# Patient Record
Sex: MALE | Race: WHITE | NOT HISPANIC OR LATINO | Employment: OTHER | URBAN - METROPOLITAN AREA
[De-identification: names, ages, dates, MRNs, and addresses within clinical notes are randomized per-mention and may not be internally consistent; named-entity substitution may affect disease eponyms.]

---

## 2018-10-02 ENCOUNTER — OFFICE VISIT (OUTPATIENT)
Dept: FAMILY MEDICINE | Facility: CLINIC | Age: 68
End: 2018-10-02
Payer: MEDICARE

## 2018-10-02 VITALS
DIASTOLIC BLOOD PRESSURE: 65 MMHG | WEIGHT: 210.63 LBS | HEART RATE: 56 BPM | TEMPERATURE: 98 F | SYSTOLIC BLOOD PRESSURE: 107 MMHG | HEIGHT: 71 IN | BODY MASS INDEX: 29.49 KG/M2

## 2018-10-02 DIAGNOSIS — M54.50 LOW BACK PAIN, NON-SPECIFIC: ICD-10-CM

## 2018-10-02 DIAGNOSIS — M54.50 ACUTE BILATERAL LOW BACK PAIN WITHOUT SCIATICA: ICD-10-CM

## 2018-10-02 DIAGNOSIS — M51.36 DDD (DEGENERATIVE DISC DISEASE), LUMBAR: Primary | ICD-10-CM

## 2018-10-02 PROCEDURE — 3008F BODY MASS INDEX DOCD: CPT | Mod: ,,, | Performed by: FAMILY MEDICINE

## 2018-10-02 PROCEDURE — 1101F PT FALLS ASSESS-DOCD LE1/YR: CPT | Mod: ,,, | Performed by: FAMILY MEDICINE

## 2018-10-02 PROCEDURE — 99213 OFFICE O/P EST LOW 20 MIN: CPT | Mod: PBBFAC,PO | Performed by: FAMILY MEDICINE

## 2018-10-02 PROCEDURE — 99213 OFFICE O/P EST LOW 20 MIN: CPT | Mod: S$PBB,,, | Performed by: FAMILY MEDICINE

## 2018-10-02 PROCEDURE — 99999 PR PBB SHADOW E&M-EST. PATIENT-LVL III: CPT | Mod: PBBFAC,,, | Performed by: FAMILY MEDICINE

## 2018-10-02 RX ORDER — METHYLPREDNISOLONE 4 MG/1
TABLET ORAL
Qty: 1 PACKAGE | Refills: 0 | Status: SHIPPED | OUTPATIENT
Start: 2018-10-02 | End: 2019-04-29

## 2018-10-02 RX ORDER — DICLOFENAC SODIUM 75 MG/1
75 TABLET, DELAYED RELEASE ORAL 2 TIMES DAILY
Qty: 60 TABLET | Refills: 1 | Status: SHIPPED | OUTPATIENT
Start: 2018-10-02 | End: 2018-11-28 | Stop reason: SDUPTHER

## 2018-10-02 NOTE — PROGRESS NOTES
The patient presents with tender painful low back p 1 m initiated w teisting motion. Has no sig radiating pain   ROS:  General: No fever or sig wt change  HEENT:No other PND eye pain or dc  Respiratory: No cough wheezing  PE: vital signs noted  HEENT: Normocephalic,with no recent trauma,PERRLA,EOMI,conjunctiva injected with no exudate.Nasopharynx is injected and edematous.External otic canal edematous and injected TM dull  Neck:Supple without adenopathy  Chest:Clear bilateral breath sounds   Heart:Regular rhthym without murmer  Abdomen:Soft, non tender,no masses, no hepatosplenomegaly  Extremeties wnl  Back:Tender mid back over scar , bilateral spasm    Impression:LBP hx Lum DDD    Plan:  Medrol dspk  PT-pt defers  NS consult

## 2018-10-03 ENCOUNTER — TELEPHONE (OUTPATIENT)
Dept: FAMILY MEDICINE | Facility: CLINIC | Age: 68
End: 2018-10-03

## 2018-10-03 NOTE — TELEPHONE ENCOUNTER
----- Message from Analia Higgins sent at 10/3/2018  2:41 PM CDT -----  Contact: [facility], radiology  Needs order for MRI faxed to them at [phone]. Thank you

## 2018-10-03 NOTE — TELEPHONE ENCOUNTER
----- Message from Muna Soriano sent at 10/3/2018 12:01 PM CDT -----  This message has been forwarded to Adelina Calix over Radiology  ----- Message -----  From: Lorelei Santana MA  Sent: 10/3/2018  11:07 AM  To: Pre Service Intake    Could someone please work the outgoing MRI referral for the above pt, he is scheduled for the MRI on Friday 10/5/18

## 2018-10-03 NOTE — TELEPHONE ENCOUNTER
----- Message from Mell Kenney sent at 10/3/2018 11:11 AM CDT -----  Forwarded to Adelina Calix, supervisor radiology team    ----- Message -----  From: Lorelei Santana MA  Sent: 10/3/2018  11:07 AM  To: Pre Service Intake    Could someone please work the outgoing MRI referral for the above pt, he is scheduled for the MRI on Friday 10/5/18

## 2018-10-09 ENCOUNTER — TELEPHONE (OUTPATIENT)
Dept: FAMILY MEDICINE | Facility: CLINIC | Age: 68
End: 2018-10-09

## 2018-10-09 DIAGNOSIS — M51.36 DDD (DEGENERATIVE DISC DISEASE), LUMBAR: Primary | ICD-10-CM

## 2018-10-09 NOTE — TELEPHONE ENCOUNTER
----- Message from Luis Bejarano sent at 10/9/2018  8:15 AM CDT -----  Contact: pt   Pt is requesting a call call back from the nurse in regards to his MRI results.  [phone]

## 2018-10-09 NOTE — TELEPHONE ENCOUNTER
REASON FOR EXAM: [M54.5]-Low back pain   TECHNICAL FACTORS: Sagittal T2, sagittal T1, sagittal fat-suppressed T2 and axial T1 and T2 sequences were obtained of the lumbar spine without administration of intravenous contrast. Post-IV contrast sagittal fat suppressed and axial images were obtained.   DOSE: 20 mL ProHance   COMPARISON: Lumbar spine radiographs July 22, 2012   FINDINGS: Alignment is normal. Bone marrow is normal in signal intensity without focal lesion. The conus is normal in signal intensity. The conus terminates at the T12-L1 level. There is mild anterior wedging of the T12 vertebral body, partially visualized on this exam, suggestive of chronic compression fracture. Lumbar vertebral body heights are maintained. There is loss of disc height at L5-S1. There is no evidence of abnormal enhancement. T2 hyperintense nonenhancing right renal cyst is present measuring 10 mm.   At the L1-2 level, there is no evidence of central canal stenosis or neural foraminal narrowing.   At the L2-3 level, facet hypertrophy is present without central canal stenosis or neural foraminal narrowing.   At the L3-4 level, minimal disc bulge and facet hypertrophy are present without central canal stenosis or neural foraminal narrowing.   At the L4-5 level, right paracentral disc protrusion has mass effect on the traversing right L5 nerve within the lateral recess. Mild facet hypertrophy is present. There is no evidence of central canal stenosis or neural foraminal narrowing.   At the L5-S1 level, mild disc bulge and facet hypertrophy are present resulting in mild right neural foraminal narrowing. A T2 hyperintense Tarlov cyst is present within the right S2 neural foramen.   IMPRESSION:   1. Degenerative disc disease and facet osteoarthritis of the lumbar spine with right paracentral disc protrusion at L4-L5 having mass effect on the traversing right L5 nerve within the lateral recess.   1. Right renal cyst.  2. Tarlov  cysts.  Electronically signed by Delfino Peterson MD on 10/5/2018 11:41 AM

## 2018-10-09 NOTE — TELEPHONE ENCOUNTER
----- Message from Luis Bejarano sent at 10/9/2018  8:15 AM CDT -----  Contact: pt   Pt is requesting a call call back from the nurse in regards to his MRI results.  [phone]

## 2018-10-09 NOTE — TELEPHONE ENCOUNTER
----- Message from Luis Bejarano sent at 10/9/2018  8:15 AM CDT -----  Contact: pt   Pt is requesting a call call back from the nurse in regards to his MRI results.  [phone]

## 2018-10-09 NOTE — TELEPHONE ENCOUNTER
Nelson, I see that you ordered and are already handling this MRI on this patient.  I have never seen the patient.  Do you need for me to get involved or are you handling this at this point?  Let me know what you would like for me to do.

## 2018-10-09 NOTE — TELEPHONE ENCOUNTER
----- Message from Luis Bejarano sent at 10/9/2018  8:15 AM CDT -----  Contact: pt   Pt is requesting a call call back from the nurse in regards to his MRI results.  [phone]

## 2018-10-15 ENCOUNTER — HOSPITAL ENCOUNTER (OUTPATIENT)
Dept: RADIOLOGY | Facility: HOSPITAL | Age: 68
Discharge: HOME | End: 2018-10-15
Attending: NEUROLOGICAL SURGERY
Payer: MEDICARE

## 2018-10-15 ENCOUNTER — OFFICE VISIT (OUTPATIENT)
Dept: NEUROSURGERY | Facility: CLINIC | Age: 68
End: 2018-10-15
Payer: MEDICARE

## 2018-10-15 VITALS
WEIGHT: 211.88 LBS | HEIGHT: 71 IN | SYSTOLIC BLOOD PRESSURE: 156 MMHG | BODY MASS INDEX: 29.66 KG/M2 | TEMPERATURE: 98 F | HEART RATE: 50 BPM | DIASTOLIC BLOOD PRESSURE: 76 MMHG

## 2018-10-15 DIAGNOSIS — M51.36 DDD (DEGENERATIVE DISC DISEASE), LUMBAR: ICD-10-CM

## 2018-10-15 DIAGNOSIS — Z98.890 H/O LUMBAR DISCECTOMY: Primary | ICD-10-CM

## 2018-10-15 PROCEDURE — 72110 X-RAY EXAM L-2 SPINE 4/>VWS: CPT | Mod: 26,,, | Performed by: RADIOLOGY

## 2018-10-15 PROCEDURE — 72110 X-RAY EXAM L-2 SPINE 4/>VWS: CPT | Mod: TC

## 2018-10-15 PROCEDURE — 99203 OFFICE O/P NEW LOW 30 MIN: CPT | Mod: S$PBB,,, | Performed by: NEUROLOGICAL SURGERY

## 2018-10-15 PROCEDURE — 99999 PR PBB SHADOW E&M-EST. PATIENT-LVL III: CPT | Mod: PBBFAC,,, | Performed by: NEUROLOGICAL SURGERY

## 2018-10-15 PROCEDURE — 3008F BODY MASS INDEX DOCD: CPT | Mod: ,,, | Performed by: NEUROLOGICAL SURGERY

## 2018-10-15 PROCEDURE — 1101F PT FALLS ASSESS-DOCD LE1/YR: CPT | Mod: ,,, | Performed by: NEUROLOGICAL SURGERY

## 2018-10-15 PROCEDURE — 99213 OFFICE O/P EST LOW 20 MIN: CPT | Mod: PBBFAC,25 | Performed by: NEUROLOGICAL SURGERY

## 2018-10-15 NOTE — PROGRESS NOTES
History & Physical    I, Uvaldo Sim, attest that this documentation has been prepared under the direction and in the presence of iVctoriano Frias MD.    10/16/2018    Chief Complaint   Patient presents with    Consult       History of Present Illness:  Wilbert Luna is a 67 y.o. patient with history of mechanical fall at work 30 years ago and previous L5-S1 laminectomy for a disc herniation. Patient was referred to me by Dr. Wale Joy MD for evaluation of back pain. Patient states that from his previous laminectomy, he had resolution of right-sided radiculopathic pain but has since continued to have on/off low back pain lasting 4 weeks at a time. He denies any back pain currently, stating that it went away 3 days ago after he jumped up from his bed to wake up from a nightmare  (Patient thinks that his back got realign after he jumped out from the bed).     Patient complains of bilateral low back pain described as aching and stabbing pain associated with back spasms. When he has pain, it is 10/10 in intensity. Exacerbating factors include bending forwards, lifting heavy objects. He states that the pain has been triggered in the past with certain motions such as squatting with weights, trunk rotation (bowling a bowling ball), walking up an incline or stairs. The pain is sometimes worse on either side and sometimes worse at the midline. Patient states having 20 episodes of back pain flare ups over the last 30 years. Past treatments include Medrol Dosepak (partial alleviation after about 1 week). Patient denies bowel/bladder incontinence. 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       Review of patient's allergies indicates:  No Known Allergies    Current Outpatient Medications   Medication Sig Dispense Refill    diclofenac (VOLTAREN) 75 MG EC tablet Take 1 tablet (75 mg total) by mouth 2 (two) times daily. 60 tablet 1     "esomeprazole (NEXIUM) 40 MG capsule Take 40 mg by mouth before breakfast.      methylPREDNISolone (MEDROL DOSEPACK) 4 mg tablet As directed 1 Package 0     No current facility-administered medications for this visit.        Past Medical History:   Diagnosis Date    Cancer skin cancer on head       Past Surgical History:   Procedure Laterality Date    HERNIA REPAIR      SPINE SURGERY         Family History   Problem Relation Age of Onset    Hypertension Mother     Cancer Father     Hypertension Maternal Grandmother     Thyroid disease Maternal Grandmother     Amblyopia Neg Hx     Blindness Neg Hx     Cataracts Neg Hx     Diabetes Neg Hx     Glaucoma Neg Hx     Macular degeneration Neg Hx     Retinal detachment Neg Hx     Strabismus Neg Hx     Stroke Neg Hx        Social History     Tobacco Use    Smoking status: Never Smoker    Smokeless tobacco: Never Used   Substance Use Topics    Alcohol use: No    Drug use: No        Review of Systems:  Review of Systems   Constitutional: Negative for appetite change.   HENT: Negative for congestion.    Eyes: Negative for discharge.   Respiratory: Negative for apnea.    Cardiovascular: Negative for chest pain.   Gastrointestinal: Negative for abdominal distention.   Endocrine: Negative for cold intolerance.   Genitourinary: Negative for difficulty urinating.   Musculoskeletal: Negative for arthralgias.   Allergic/Immunologic: Negative for environmental allergies.   Neurological: Negative for dizziness, weakness and headaches.   Psychiatric/Behavioral: Negative for agitation.       Vital Signs (Most Recent)  Temp: 98.1 °F (36.7 °C) (10/15/18 0909)  Pulse: (!) 50 (10/15/18 0909)  BP: (!) 156/76 (10/15/18 0909)  5' 11" (1.803 m)  96.1 kg (211 lb 14.4 oz)       Physical Exam:  Physical Exam:    Constitutional: He appears well-developed.     Eyes: Pupils are equal, round, and reactive to light. EOM are normal. Right eye exhibits no discharge. Left eye exhibits no " discharge.     Abdominal: Soft.     Skin: Skin displays no rash on trunk and no rash on extremities.     Psych/Behavior: He is alert. He is oriented to person, place, and time.     Musculoskeletal: Gait is normal.        Neck: There is no tenderness.        Back: Range of motion is full.        Right Upper Extremities: Range of motion is full. Muscle strength is 5/5. Tone is normal.        Left Upper Extremities: Range of motion is full. Muscle strength is 5/5. Tone is normal.       Right Lower Extremities: Range of motion is full. Muscle strength is 5/5. Tone is normal.        Left Lower Extremities: Range of motion is full. Muscle strength is 5/5. Tone is normal.     Neurological:        Coordination: He has a normal Romberg Test and normal tandem walking coordination.        Sensory: There is no sensory deficit in the trunk. There is no sensory deficit in the extremities.        DTRs: DTRs are DTRS NORMAL AND SYMMETRICnormal and symmetric. Tricep reflexes are 2+ on the right side and 2+ on the left side. Bicep reflexes are 2+ on the right side and 2+ on the left side. Brachioradialis reflexes are 2+ on the right side and 2+ on the left side. Patellar reflexes are 2+ on the right side and 2+ on the left side. Achilles reflexes are 2+ on the right side and 2+ on the left side. He displays no Babinski's sign on the right side. He displays no Babinski's sign on the left side.        Cranial nerves: Cranial nerve(s) II, III, IV, V, VI, VII, VIII, IX, X, XI and XII are intact.     Normal tandem gait.   Negative Romberg.   Strength is 5/5 throughout bilateral lower extremities   Negative straight leg raising test and negative Richy's test bilaterally.   Sensation is intact to light touch throughout     Laboratory  CBC: Reviewed  CMP: Reviewed    Diagnostic Results:  X-Ray: Reviewed  I have personally reviewed the MRI of the Lumbar spine from [facility]  Dated 10/05/2018.   Lumbar spondylosis without significant canal  or foraminal stenosis. Previous L5-S1 discectomy    ASSESSMENT/PLAN:       ICD-10-CM ICD-9-CM   1. H/O lumbar discectomy Z98.890 V15.29   2. DDD (degenerative disc disease), lumbar M51.36 722.52       PLAN:    1. Lumbar spondylosis without significant canal or foraminal stenosis and lower back spasms in the setting of previous L5-S1 discectomy. Patient's symptoms are non-radiculopathic in nature and are very sporadic, only when he lifts weights or rotates his lower spine. At this point, he does not require any neurosurgical interventions, but we will get flexion/extension x-rays of the lumbar spine to rule out instability.     2. RTC PRN or sooner if any positive findings on flexion/extension x-rays of the lumbar spine.     3. I spent 45 minutes with the patient, 50% of time in counselingabout the above mentioned issues.            Wilbert was seen today for consult.    Diagnoses and all orders for this visit:    H/O lumbar discectomy    DDD (degenerative disc disease), lumbar  -     X-Ray Lumbar Complete With Flex And Ext; Future  -     Ambulatory Referral to Physical/Occupational Therapy      I, Dr. Victoriano Frias, personally performed the services described in this documentation. All medical record entries made by the scribe were at my direction and in my presence.  I have reviewed the chart and agree that the record reflects my personal performance and is accurate and complete. Victoriano Frias MD.  10:00 AM 10/16/2018

## 2018-10-16 ENCOUNTER — TELEPHONE (OUTPATIENT)
Dept: NEUROSURGERY | Facility: CLINIC | Age: 68
End: 2018-10-16

## 2018-10-16 PROBLEM — Z98.890 H/O LUMBAR DISCECTOMY: Status: ACTIVE | Noted: 2018-10-16

## 2018-10-16 NOTE — TELEPHONE ENCOUNTER
Pt was contacted and informed.  ----- Message from Victoriano Frias MD sent at 10/16/2018 10:02 AM CDT -----   This gentleman's x-ray look within normal limits.  There is no instability.  He does not need further follow-up with Neurosurgery.  Please let the patient know ( there is a previous T12 compression fracture, that is stable on x-rays, compared with previous MRI from 2014)     thanks    e

## 2018-11-19 ENCOUNTER — PES CALL (OUTPATIENT)
Dept: ADMINISTRATIVE | Facility: CLINIC | Age: 68
End: 2018-11-19

## 2018-11-21 ENCOUNTER — PES CALL (OUTPATIENT)
Dept: ADMINISTRATIVE | Facility: CLINIC | Age: 68
End: 2018-11-21

## 2018-11-28 DIAGNOSIS — Z79.899 MEDICATION MANAGEMENT: Primary | ICD-10-CM

## 2018-11-28 RX ORDER — DICLOFENAC SODIUM 75 MG/1
75 TABLET, DELAYED RELEASE ORAL 2 TIMES DAILY
Qty: 180 TABLET | Refills: 0 | Status: SHIPPED | OUTPATIENT
Start: 2018-11-28 | End: 2019-04-29

## 2018-11-28 NOTE — TELEPHONE ENCOUNTER
I have signed for the following orders AND/OR meds.  Please call the patient and ask the patient to schedule the testing AND/OR inform about any medications that were sent.      Orders Placed This Encounter   Procedures    Basic metabolic panel     Standing Status:   Future     Standing Expiration Date:   1/27/2020    CBC auto differential     Standing Status:   Future     Standing Expiration Date:   1/27/2020       Medications Ordered This Encounter   Medications    diclofenac (VOLTAREN) 75 MG EC tablet     Sig: Take 1 tablet (75 mg total) by mouth 2 (two) times daily.     Dispense:  180 tablet     Refill:  0

## 2018-11-28 NOTE — TELEPHONE ENCOUNTER
The patient is requesting a medication that can affect the kidneys and there is not a recent renal function panel on file.  Book a BMP soon along with a CBC.  I will refill the medicine x 1.

## 2018-12-06 ENCOUNTER — LAB VISIT (OUTPATIENT)
Dept: LAB | Facility: HOSPITAL | Age: 68
End: 2018-12-06
Attending: FAMILY MEDICINE
Payer: MEDICARE

## 2018-12-06 DIAGNOSIS — Z79.899 MEDICATION MANAGEMENT: ICD-10-CM

## 2018-12-06 LAB
ANION GAP SERPL CALC-SCNC: 9 MMOL/L
BASOPHILS # BLD AUTO: 0.04 K/UL
BASOPHILS NFR BLD: 0.6 %
BUN SERPL-MCNC: 13 MG/DL
CALCIUM SERPL-MCNC: 9.4 MG/DL
CHLORIDE SERPL-SCNC: 103 MMOL/L
CO2 SERPL-SCNC: 28 MMOL/L
CREAT SERPL-MCNC: 1.1 MG/DL
DIFFERENTIAL METHOD: NORMAL
EOSINOPHIL # BLD AUTO: 0.3 K/UL
EOSINOPHIL NFR BLD: 4.6 %
ERYTHROCYTE [DISTWIDTH] IN BLOOD BY AUTOMATED COUNT: 12.9 %
EST. GFR  (AFRICAN AMERICAN): >60 ML/MIN/1.73 M^2
EST. GFR  (NON AFRICAN AMERICAN): >60 ML/MIN/1.73 M^2
GLUCOSE SERPL-MCNC: 90 MG/DL
HCT VFR BLD AUTO: 45.3 %
HGB BLD-MCNC: 14.6 G/DL
IMM GRANULOCYTES # BLD AUTO: 0.03 K/UL
IMM GRANULOCYTES NFR BLD AUTO: 0.5 %
LYMPHOCYTES # BLD AUTO: 2.5 K/UL
LYMPHOCYTES NFR BLD: 37.4 %
MCH RBC QN AUTO: 28.3 PG
MCHC RBC AUTO-ENTMCNC: 32.2 G/DL
MCV RBC AUTO: 88 FL
MONOCYTES # BLD AUTO: 0.5 K/UL
MONOCYTES NFR BLD: 7 %
NEUTROPHILS # BLD AUTO: 3.3 K/UL
NEUTROPHILS NFR BLD: 49.9 %
NRBC BLD-RTO: 0 /100 WBC
PLATELET # BLD AUTO: 241 K/UL
PMV BLD AUTO: 10.2 FL
POTASSIUM SERPL-SCNC: 4.6 MMOL/L
RBC # BLD AUTO: 5.16 M/UL
SODIUM SERPL-SCNC: 140 MMOL/L
WBC # BLD AUTO: 6.55 K/UL

## 2018-12-06 PROCEDURE — 80048 BASIC METABOLIC PNL TOTAL CA: CPT

## 2018-12-06 PROCEDURE — 85025 COMPLETE CBC W/AUTO DIFF WBC: CPT

## 2018-12-06 PROCEDURE — 36415 COLL VENOUS BLD VENIPUNCTURE: CPT | Mod: PO

## 2018-12-06 NOTE — PROGRESS NOTES
I have reviewed the BMP which is a panel of all electrolytes including a look at the kidney and to look at your sugar level.  All of the numbers appear acceptable.  Please consider rechecking this in one year at the time of the annual physical if it is still indicated.    I have reviewed the CBC which is a comprehensive review of the blood count, white count and differential of white cells.  All of the findings are acceptable at this time and no significant changes are noted that would indicate that other testing is indicated based on that.

## 2019-01-02 ENCOUNTER — TELEPHONE (OUTPATIENT)
Dept: FAMILY MEDICINE | Facility: CLINIC | Age: 69
End: 2019-01-02

## 2019-01-02 NOTE — TELEPHONE ENCOUNTER
----- Message from Sloane Hi sent at 1/2/2019  1:11 PM CST -----  Contact: Pt   Pt requesting call back regarding lab results, pt wants to know if results can be mailed  Call back: [phone]

## 2019-01-22 ENCOUNTER — OFFICE VISIT (OUTPATIENT)
Dept: FAMILY MEDICINE | Facility: CLINIC | Age: 69
End: 2019-01-22
Payer: MEDICARE

## 2019-01-22 VITALS
SYSTOLIC BLOOD PRESSURE: 138 MMHG | BODY MASS INDEX: 29.82 KG/M2 | HEIGHT: 71 IN | DIASTOLIC BLOOD PRESSURE: 85 MMHG | TEMPERATURE: 98 F | WEIGHT: 213 LBS | HEART RATE: 59 BPM

## 2019-01-22 DIAGNOSIS — S90.452A: Primary | ICD-10-CM

## 2019-01-22 PROCEDURE — 1101F PR PT FALLS ASSESS DOC 0-1 FALLS W/OUT INJ PAST YR: ICD-10-PCS | Mod: S$GLB,,, | Performed by: NURSE PRACTITIONER

## 2019-01-22 PROCEDURE — 99999 PR PBB SHADOW E&M-EST. PATIENT-LVL III: ICD-10-PCS | Mod: PBBFAC,,, | Performed by: NURSE PRACTITIONER

## 2019-01-22 PROCEDURE — 1101F PT FALLS ASSESS-DOCD LE1/YR: CPT | Mod: S$GLB,,, | Performed by: NURSE PRACTITIONER

## 2019-01-22 PROCEDURE — 99999 PR PBB SHADOW E&M-EST. PATIENT-LVL III: CPT | Mod: PBBFAC,,, | Performed by: NURSE PRACTITIONER

## 2019-01-22 PROCEDURE — 99213 PR OFFICE/OUTPT VISIT, EST, LEVL III, 20-29 MIN: ICD-10-PCS | Mod: S$GLB,,, | Performed by: NURSE PRACTITIONER

## 2019-01-22 PROCEDURE — 99213 OFFICE O/P EST LOW 20 MIN: CPT | Mod: S$GLB,,, | Performed by: NURSE PRACTITIONER

## 2019-01-22 RX ORDER — MUPIROCIN 20 MG/G
OINTMENT TOPICAL 2 TIMES DAILY
Qty: 1 TUBE | Refills: 0 | Status: SHIPPED | OUTPATIENT
Start: 2019-01-22 | End: 2019-02-01

## 2019-01-22 NOTE — PATIENT INSTRUCTIONS
Foreign Object Under the Skin (Removed)  An object has been removed from under your skin. Although care was taken to remove all of it, there is always a chance that a small piece may have been left behind.  Most skin wounds heal without problems. But there can be an increased risk of infection if anything stays under the skin. Sometimes the pieces work their way out on their own, and sometimes they can cause an infection. Very small pieces that stay under the skin usually dont cause a problem or need further treatment.  Home care  Wound care  · Keep the wound clean and dry.  · If there is a dressing or bandage, change it when it gets wet or dirty. Otherwise, leave it on for the first 24 hours, then change it once a day or as often as you were instructed.  · If stitches or staples were used, clean the wound every day:  ¨ After taking off the dressing, wash the area gently with soap and water.  ¨ Apply a thin layer of antibiotic ointment to the cut. This will keep the wound clean and make it easier to remove the stitches. If it is oozing a lot, you can put a nonstick dressing over it. Then reapply the bandage or dressing as you were instructed.  ¨ You can get it wet, just like when you clean it. This means you can shower as usual for the first 24 hours, but do not soak the area in water (no baths or swimming) until the stitches or staples are taken out.  · If surgical tape or strips were used, keep the area clean and dry. If it becomes wet, blot it dry with a towel.    Medicine  · You can take over-the-counter medicine for pain, unless you were given a different pain medicine to use. If you have chronic liver or kidney disease or ever had a stomach ulcer, or gastrointestinal bleeding or are taking blood thinner medicines, talk with your healthcare provider before using these medicines.  · If you were given antibiotics, take them until they are used up. It is important to finish the antibiotics even if the wound  looks better to make sure the infection clears.  Follow-up care  Follow up your healthcare provider, or as advised. Keep in mind the following:  · Watch for any signs of infection, such as increasing pain, redness, swelling, or pus drainage. If this happens, dont wait for your scheduled visit, rather see your healthcare provider sooner.  · Stitches or staples are usually taken out within 5 to 14 days. This varies depending on what part of your body they are on, and the type of wound. The healthcare provider will tell you how long they should be left in.  · If surgical tape or strips were used, they are usually left on for 7 to 10 days. You can remove them after that unless you were told otherwise. If you try to remove them, and it is too difficult, soaking can help. If the edges of the cut pull apart, then stop removing the tape, and follow up with your healthcare provider.  · If X-rays were taken, you will be told if there are new findings that may affect your care.  When to seek medical care  Call your healthcare provider right away if any of these occur:  · Fever of 100.4ºF (38ºC) or higher, or as directed by your healthcare provider  · Increasing pain in the wound  · Redness, swelling or pus coming from the wound  Date Last Reviewed: 10/1/2016  © 6242-6329 [employer]. [address] [place] [address], [place] [address]. All rights reserved. This information is not intended as a substitute for professional medical care. Always follow your healthcare professional's instructions.

## 2019-04-16 ENCOUNTER — PATIENT OUTREACH (OUTPATIENT)
Dept: ADMINISTRATIVE | Facility: HOSPITAL | Age: 69
End: 2019-04-16

## 2019-04-29 ENCOUNTER — OFFICE VISIT (OUTPATIENT)
Dept: FAMILY MEDICINE | Facility: CLINIC | Age: 69
End: 2019-04-29
Payer: MEDICARE

## 2019-04-29 VITALS
HEIGHT: 71 IN | DIASTOLIC BLOOD PRESSURE: 72 MMHG | WEIGHT: 208.81 LBS | BODY MASS INDEX: 29.23 KG/M2 | SYSTOLIC BLOOD PRESSURE: 128 MMHG | HEART RATE: 55 BPM | TEMPERATURE: 98 F

## 2019-04-29 DIAGNOSIS — Z00.00 ANNUAL PHYSICAL EXAM: Primary | ICD-10-CM

## 2019-04-29 PROCEDURE — 99397 PR PREVENTIVE VISIT,EST,65 & OVER: ICD-10-PCS | Mod: 25,S$GLB,, | Performed by: FAMILY MEDICINE

## 2019-04-29 PROCEDURE — 90670 PCV13 VACCINE IM: CPT | Mod: S$GLB,,, | Performed by: FAMILY MEDICINE

## 2019-04-29 PROCEDURE — 90670 PNEUMOCOCCAL CONJUGATE VACCINE 13-VALENT LESS THAN 5YO & GREATER THAN: ICD-10-PCS | Mod: S$GLB,,, | Performed by: FAMILY MEDICINE

## 2019-04-29 PROCEDURE — 99397 PER PM REEVAL EST PAT 65+ YR: CPT | Mod: 25,S$GLB,, | Performed by: FAMILY MEDICINE

## 2019-04-29 PROCEDURE — G0009 PNEUMOCOCCAL CONJUGATE VACCINE 13-VALENT LESS THAN 5YO & GREATER THAN: ICD-10-PCS | Mod: S$GLB,,, | Performed by: FAMILY MEDICINE

## 2019-04-29 PROCEDURE — G0009 ADMIN PNEUMOCOCCAL VACCINE: HCPCS | Mod: S$GLB,,, | Performed by: FAMILY MEDICINE

## 2019-04-29 PROCEDURE — 99999 PR PBB SHADOW E&M-EST. PATIENT-LVL III: ICD-10-PCS | Mod: PBBFAC,,, | Performed by: FAMILY MEDICINE

## 2019-04-29 PROCEDURE — 99999 PR PBB SHADOW E&M-EST. PATIENT-LVL III: CPT | Mod: PBBFAC,,, | Performed by: FAMILY MEDICINE

## 2019-04-29 RX ORDER — SODIUM, POTASSIUM,MAG SULFATES 17.5-3.13G
SOLUTION, RECONSTITUTED, ORAL ORAL
Qty: 354 ML | Refills: 0 | Status: ON HOLD | OUTPATIENT
Start: 2019-04-29 | End: 2019-06-05 | Stop reason: HOSPADM

## 2019-04-29 RX ORDER — PROMETHAZINE HYDROCHLORIDE 25 MG/1
25 TABLET ORAL EVERY 6 HOURS PRN
Qty: 6 TABLET | Refills: 0 | Status: SHIPPED | OUTPATIENT
Start: 2019-04-29 | End: 2019-05-02

## 2019-04-29 NOTE — PROGRESS NOTES
Subjective:      Patient ID: Wilbert Luna is a 68 y.o. male.    Chief Complaint: Annual Exam    Problem List Items Addressed This Visit     None        He is here for a physical.  He is due for a lot of updates on the health maintenance.      Past Medical History:  Past Medical History:   Diagnosis Date    Cancer skin cancer on head     Past Surgical History:   Procedure Laterality Date    HERNIA REPAIR      SPINE SURGERY       Review of patient's allergies indicates:  No Known Allergies  Current Outpatient Medications on File Prior to Visit   Medication Sig Dispense Refill    UNABLE TO FIND protandin      [DISCONTINUED] diclofenac (VOLTAREN) 75 MG EC tablet Take 1 tablet (75 mg total) by mouth 2 (two) times daily. 180 tablet 0    [DISCONTINUED] esomeprazole (NEXIUM) 40 MG capsule Take 40 mg by mouth before breakfast.      [DISCONTINUED] methylPREDNISolone (MEDROL DOSEPACK) 4 mg tablet As directed 1 Package 0     No current facility-administered medications on file prior to visit.      Social History     Socioeconomic History    Marital status:      Spouse name: Not on file    Number of children: Not on file    Years of education: Not on file    Highest education level: Not on file   Occupational History    Not on file   Social Needs    Financial resource strain: Not on file    Food insecurity:     Worry: Not on file     Inability: Not on file    Transportation needs:     Medical: Not on file     Non-medical: Not on file   Tobacco Use    Smoking status: Never Smoker    Smokeless tobacco: Never Used   Substance and Sexual Activity    Alcohol use: No    Drug use: No    Sexual activity: Yes     Partners: Female   Lifestyle    Physical activity:     Days per week: Not on file     Minutes per session: Not on file    Stress: Not on file   Relationships    Social connections:     Talks on phone: Not on file     Gets together: Not on file     Attends Hindu service: Not on file      "Active member of club or organization: Not on file     Attends meetings of clubs or organizations: Not on file     Relationship status: Not on file   Other Topics Concern    Not on file   Social History Narrative    Not on file     Family History   Problem Relation Age of Onset    Hypertension Mother     Cancer Father     Hypertension Maternal Grandmother     Thyroid disease Maternal Grandmother     Amblyopia Neg Hx     Blindness Neg Hx     Cataracts Neg Hx     Diabetes Neg Hx     Glaucoma Neg Hx     Macular degeneration Neg Hx     Retinal detachment Neg Hx     Strabismus Neg Hx     Stroke Neg Hx        Review of Systems   Constitutional: Negative.  Negative for chills, diaphoresis and fever.   HENT: Negative for congestion, hearing loss, mouth sores, postnasal drip and sore throat.    Eyes: Negative for pain and visual disturbance.   Respiratory: Negative for cough, chest tightness, shortness of breath and wheezing.    Cardiovascular: Negative for chest pain.   Gastrointestinal: Negative for abdominal pain, anal bleeding, blood in stool, constipation, diarrhea, nausea and vomiting.   Genitourinary: Negative for dysuria and hematuria.   Musculoskeletal: Negative for back pain, neck pain and neck stiffness.   Skin: Negative for rash.   Neurological: Negative for dizziness and weakness.       Objective:     /72   Pulse (!) 55   Temp 97.8 °F (36.6 °C) (Oral)   Ht 5' 11" (1.803 m)   Wt 94.7 kg (208 lb 12.8 oz)   BMI 29.12 kg/m²     Physical Exam   Constitutional: He is oriented to person, place, and time. He appears well-developed and well-nourished.   HENT:   Head: Normocephalic and atraumatic.   Right Ear: External ear normal.   Left Ear: External ear normal.   Nose: Nose normal.   Mouth/Throat: Oropharynx is clear and moist. No oropharyngeal exudate.   Eyes: Pupils are equal, round, and reactive to light. Conjunctivae and EOM are normal. Right eye exhibits no discharge. Left eye exhibits no " discharge. No scleral icterus.   Neck: Normal range of motion. Neck supple. No JVD present. No thyromegaly present.   Cardiovascular: Normal rate, regular rhythm, normal heart sounds and intact distal pulses. Exam reveals no gallop and no friction rub.   No murmur heard.  Pulmonary/Chest: Effort normal and breath sounds normal. No respiratory distress. He has no wheezes. He has no rales. He exhibits no tenderness.   Abdominal: Soft. Bowel sounds are normal. He exhibits no distension and no mass. There is no tenderness. There is no rebound and no guarding.   Musculoskeletal: Normal range of motion. He exhibits no edema or tenderness.   Lymphadenopathy:     He has no cervical adenopathy.   Neurological: He is alert and oriented to person, place, and time. No cranial nerve deficit. Coordination normal.   Skin: Skin is warm and dry. He is not diaphoretic.   Psychiatric: He has a normal mood and affect.     Assessment:     1. Annual physical exam        Plan:     Problem List Items Addressed This Visit     None      Visit Diagnoses     Annual physical exam    -  Primary    Relevant Orders    Lipid panel    PSA, Screening    Hepatitis C antibody    Case request GI: COLONOSCOPY (Completed)        No follow-ups on file.

## 2019-04-30 ENCOUNTER — LAB VISIT (OUTPATIENT)
Dept: LAB | Facility: HOSPITAL | Age: 69
End: 2019-04-30
Attending: FAMILY MEDICINE
Payer: MEDICARE

## 2019-04-30 DIAGNOSIS — Z00.00 ANNUAL PHYSICAL EXAM: ICD-10-CM

## 2019-04-30 LAB
CHOLEST SERPL-MCNC: 215 MG/DL (ref 120–199)
CHOLEST/HDLC SERPL: 3.9 {RATIO} (ref 2–5)
COMPLEXED PSA SERPL-MCNC: 0.95 NG/ML (ref 0–4)
HDLC SERPL-MCNC: 55 MG/DL (ref 40–75)
HDLC SERPL: 25.6 % (ref 20–50)
LDLC SERPL CALC-MCNC: 137 MG/DL (ref 63–159)
NONHDLC SERPL-MCNC: 160 MG/DL
TRIGL SERPL-MCNC: 115 MG/DL (ref 30–150)

## 2019-04-30 PROCEDURE — 80061 LIPID PANEL: CPT

## 2019-04-30 PROCEDURE — 36415 COLL VENOUS BLD VENIPUNCTURE: CPT | Mod: PO

## 2019-04-30 PROCEDURE — 84153 ASSAY OF PSA TOTAL: CPT

## 2019-04-30 PROCEDURE — 86803 HEPATITIS C AB TEST: CPT

## 2019-05-01 LAB — HCV AB SERPL QL IA: NEGATIVE

## 2019-05-02 ENCOUNTER — TELEPHONE (OUTPATIENT)
Dept: ENDOSCOPY | Facility: HOSPITAL | Age: 69
End: 2019-05-02

## 2019-05-02 NOTE — PROGRESS NOTES
The labs indicate no sign of viral hepatitis c on the screening test.  The patient has a normal PSA so recheck that in 1 year.   .   The cholesterol is a little high but is not high enough for medicine.  Recheck in 1 year.

## 2019-05-02 NOTE — TELEPHONE ENCOUNTER
Endoscopy Scheduling Questionnaire:    1. Have you been admitted overnight to the hospital in the past 3 months? no  2. Do you get chest pain and SOB while walking up a flight of stairs? no  3. Have you had a cardiac stent placed in the past 12 months? no  4. Have you had a stroke or heart attack in the past 6 months? no  5. Have you had any chest pain in the past 3 months? no      If so, have you been evaluated by your PCP or Cardiologist? no  6. Do you take medication for weight loss? no  7. Have you been diagnosed with Diverticulitis within the past 3 months? no  8. Are you having any GI symptoms that you feel need to be evaluated prior to your procedure? no  9. Are you on dialysis? no  10. Are you diabetic? no  11. Do you have any other health issues that you feel might limit your ability to safely have the procedure and/or sedation? no  12. Is the patient over 79 yo? no        If so, has the patient been seen by their PCP or GI in the last 3 months? N/A       -I have reviewed the last colonoscopy for recommendations regarding surveillance and bowel prep  is NA  -I have reviewed the patient's medications and allergies. He is not on high risk medication, , and will require cardiac clearance. A clearance request NA  -I have verified the pharmacy information. The prep being used is Suprep. The patient's prep instructions were sent via mail..    Date Endoscopy Scheduled: Date: 6/5/19

## 2019-05-21 DIAGNOSIS — Z12.11 COLON CANCER SCREENING: ICD-10-CM

## 2019-06-05 ENCOUNTER — HOSPITAL ENCOUNTER (OUTPATIENT)
Facility: HOSPITAL | Age: 69
Discharge: HOME | End: 2019-06-05
Attending: INTERNAL MEDICINE | Admitting: INTERNAL MEDICINE
Payer: MEDICARE

## 2019-06-05 ENCOUNTER — ANESTHESIA EVENT (OUTPATIENT)
Dept: ENDOSCOPY | Facility: HOSPITAL | Age: 69
End: 2019-06-05
Payer: MEDICARE

## 2019-06-05 ENCOUNTER — ANESTHESIA (OUTPATIENT)
Dept: ENDOSCOPY | Facility: HOSPITAL | Age: 69
End: 2019-06-05
Payer: MEDICARE

## 2019-06-05 DIAGNOSIS — Z12.11 COLON CANCER SCREENING: ICD-10-CM

## 2019-06-05 PROCEDURE — 25000003 PHARM REV CODE 250: Performed by: INTERNAL MEDICINE

## 2019-06-05 PROCEDURE — G0121 COLON CA SCRN NOT HI RSK IND: HCPCS | Performed by: INTERNAL MEDICINE

## 2019-06-05 PROCEDURE — 37000008 HC ANESTHESIA 1ST 15 MINUTES: Performed by: INTERNAL MEDICINE

## 2019-06-05 PROCEDURE — 63600175 PHARM REV CODE 636 W HCPCS: Performed by: NURSE ANESTHETIST, CERTIFIED REGISTERED

## 2019-06-05 PROCEDURE — G0121 COLON CA SCRN NOT HI RSK IND: ICD-10-PCS | Mod: ,,, | Performed by: INTERNAL MEDICINE

## 2019-06-05 PROCEDURE — 37000009 HC ANESTHESIA EA ADD 15 MINS: Performed by: INTERNAL MEDICINE

## 2019-06-05 PROCEDURE — G0121 COLON CA SCRN NOT HI RSK IND: HCPCS | Mod: ,,, | Performed by: INTERNAL MEDICINE

## 2019-06-05 RX ORDER — SODIUM CHLORIDE, SODIUM LACTATE, POTASSIUM CHLORIDE, CALCIUM CHLORIDE 600; 310; 30; 20 MG/100ML; MG/100ML; MG/100ML; MG/100ML
INJECTION, SOLUTION INTRAVENOUS CONTINUOUS
Status: DISCONTINUED | OUTPATIENT
Start: 2019-06-05 | End: 2019-06-05 | Stop reason: HOSPADM

## 2019-06-05 RX ORDER — SODIUM CHLORIDE 0.9 % (FLUSH) 0.9 %
10 SYRINGE (ML) INJECTION
Status: DISCONTINUED | OUTPATIENT
Start: 2019-06-05 | End: 2019-06-05 | Stop reason: HOSPADM

## 2019-06-05 RX ORDER — PROPOFOL 10 MG/ML
INJECTION, EMULSION INTRAVENOUS
Status: DISCONTINUED | OUTPATIENT
Start: 2019-06-05 | End: 2019-06-05

## 2019-06-05 RX ADMIN — PROPOFOL 40 MG: 10 INJECTION, EMULSION INTRAVENOUS at 08:06

## 2019-06-05 RX ADMIN — SODIUM CHLORIDE, SODIUM LACTATE, POTASSIUM CHLORIDE, AND CALCIUM CHLORIDE: 600; 310; 30; 20 INJECTION, SOLUTION INTRAVENOUS at 08:06

## 2019-06-05 RX ADMIN — PROPOFOL 100 MG: 10 INJECTION, EMULSION INTRAVENOUS at 08:06

## 2019-06-05 RX ADMIN — SODIUM CHLORIDE, SODIUM LACTATE, POTASSIUM CHLORIDE, AND CALCIUM CHLORIDE: 600; 310; 30; 20 INJECTION, SOLUTION INTRAVENOUS at 07:06

## 2019-06-05 NOTE — ANESTHESIA PREPROCEDURE EVALUATION
06/05/2019  Wilbert Luna is a 68 y.o., male.    Anesthesia Evaluation    I have reviewed the Patient Summary Reports.    I have reviewed the Nursing Notes.   I have reviewed the Medications.     Review of Systems  Anesthesia Hx:  No problems with previous Anesthesia  Denies Family Hx of Anesthesia complications.   Denies Personal Hx of Anesthesia complications.   Social:  Social Alcohol Use    Hematology/Oncology:  Hematology Normal   Oncology Normal     EENT/Dental:EENT/Dental Normal   Cardiovascular:  Cardiovascular Normal Exercise tolerance: good     Pulmonary:  Pulmonary Normal    Renal/:  Renal/ Normal     Hepatic/GI:  Hepatic/GI Normal    Musculoskeletal:  Musculoskeletal Normal    Neurological:  Neurology Normal    Endocrine:  Endocrine Normal    Dermatological:  Skin Normal    Psych:  Psychiatric Normal           Physical Exam  General:  Well nourished    Airway/Jaw/Neck:  Airway Findings: Mouth Opening: Normal Tongue: Normal  General Airway Assessment: Adult, Good  Mallampati: II     Eyes/Ears/Nose:  Eyes/Ears/Nose Findings:    Dental:  Dental Findings: In tact   Chest/Lungs:  Chest/Lungs Findings: Normal Respiratory Rate, Clear to auscultation     Heart/Vascular:  Heart Findings: Rate: Normal  Rhythm: Regular Rhythm  Sounds: Normal        Mental Status:  Mental Status Findings:  Cooperative, Alert and Oriented         Anesthesia Plan  Type of Anesthesia, risks & benefits discussed:  Anesthesia Type:  MAC  Patient's Preference:   Intra-op Monitoring Plan:   Intra-op Monitoring Plan Comments:   Post Op Pain Control Plan:   Post Op Pain Control Plan Comments:   Induction:   IV  Beta Blocker:  Patient is not currently on a Beta-Blocker (No further documentation required).       Informed Consent: Patient understands risks and agrees with Anesthesia plan.  Questions answered. Anesthesia consent  signed with patient.  ASA Score: 2     Day of Surgery Review of History & Physical: I have interviewed and examined the patient. I have reviewed the patient's H&P dated:  There are no significant changes.          Ready For Surgery From Anesthesia Perspective.

## 2019-06-05 NOTE — INTERVAL H&P NOTE
The patient has been examined and the H&P has been reviewed:I have reviewed this note and I agree with this assessment. The patient remains stable for endoscopy at the time of this present evaluation. GH      Anesthesia/Surgery risks, benefits and alternative options discussed and understood by patient/family.          Active Hospital Problems    Diagnosis  POA    Colon cancer screening [Z12.11]  Not Applicable      Resolved Hospital Problems   No resolved problems to display.

## 2019-06-05 NOTE — DISCHARGE SUMMARY
[facility]  Brief Operative Note     SUMMARY     Surgery Date: 6/5/2019     Surgeon(s) and Role:     * Kaiden Gallagher III, MD - Primary    Assisting Surgeon: None    Pre-op Diagnosis:  Colon cancer screening [Z12.11]    Post-op Diagnosis:  Post-Op Diagnosis Codes:     * Colon cancer screening [Z12.11]    Procedure(s) (LRB):  COLONOSCOPY (N/A)    Anesthesia: Local MAC    Description of the findings of the procedure: Procedures completed. See Procedure note for full details.    Findings/Key Components: Procedures completed. See Procedure note for full details.    Prosthetics/Devices: None    Estimated Blood Loss: * No values recorded between 6/5/2019 12:00 AM and 6/5/2019  8:42 AM *         Specimens:   Specimen (12h ago, onward)    None          Discharge Note    SUMMARY     Admit Date: 6/5/2019    Discharge Date and Time: 6/5/2019    Hospital Course (synopsis of major diagnoses, care, treatment, and services provided during the course of the hospital stay):  Procedures completed. See Procedure note for full details. Discharge patient when discharge criteria met.    Final Diagnosis: Post-Op Diagnosis Codes:     * Colon cancer screening [Z12.11]    Disposition: Discharge patient when discharge criteria met.    Follow Up/Patient Instructions:       Medications:  Reconciled Home Medications:   Current Discharge Medication List      STOP taking these medications       sodium,potassium,mag sulfates (SUPREP BOWEL PREP KIT) 17.5-3.13-1.6 gram SolR Comments:   Reason for Stopping:              Discharge Procedure Orders   Diet general     Activity as tolerated

## 2019-06-05 NOTE — ANESTHESIA RELEASE NOTE
"Anesthesia Release from PACU Note    Patient: Wilbert Luna    Procedure(s) Performed: Procedure(s) (LRB):  COLONOSCOPY (N/A)    Anesthesia type: MAC    Post pain: Adequate analgesia    Post assessment: no apparent anesthetic complications    Last Vitals:   Visit Vitals  BP 93/65 (BP Location: Left arm, Patient Position: Sitting)   Pulse (!) 57   Temp 36.7 °C (98.1 °F) (Temporal)   Resp 14   Ht 5' 11" (1.803 m)   Wt 91.2 kg (201 lb 1 oz)   SpO2 96%   BMI 28.04 kg/m²       Post vital signs: stable    Level of consciousness: responds to stimulation    Nausea/Vomiting: no nausea/no vomiting    Complications: none    Airway Patency: patent    Respiratory: unassisted, room air    Cardiovascular: stable and blood pressure at baseline    Hydration: euvolemic  "

## 2019-06-05 NOTE — [employer] PATIENT INSTRUCTIONS
Discharge Summary/Instructions after an Endoscopic Procedure  Patient Name: Wilbert Luna  Patient MRN: [id number]  Patient YOB: 1950 Wednesday, June 05, 2019 Kaiden Gallagher III, MD  RESTRICTIONS:  During your procedure today, you received medications for sedation.  These   medications may affect your judgment, balance and coordination.  Therefore,   for 24 hours, you have the following restrictions:   - DO NOT drive a car, operate machinery, make legal/financial decisions,   sign important papers or drink alcohol.    ACTIVITY:  Today: no heavy lifting, straining or running due to procedural   sedation/anesthesia.  The following day: return to full activity including work.  DIET:  Eat and drink normally unless instructed otherwise.     TREATMENT FOR COMMON SIDE EFFECTS:  - Mild abdominal pain, nausea, belching, bloating or excessive gas:  rest,   eat lightly and use a heating pad.  - Sore Throat: treat with throat lozenges and/or gargle with warm salt   water.  - Because air was used during the procedure, expelling large amounts of air   from your rectum or belching is normal.  - If a bowel prep was taken, you may not have a bowel movement for 1-3 days.    This is normal.  SYMPTOMS TO WATCH FOR AND REPORT TO YOUR PHYSICIAN:  1. Abdominal pain or bloating, other than gas cramps.  2. Chest pain.  3. Back pain.  4. Signs of infection such as: chills or fever occurring within 24 hours   after the procedure.  5. Rectal bleeding, which would show as bright red, maroon, or black stools.   (A tablespoon of blood from the rectum is not serious, especially if   hemorrhoids are present.)  6. Vomiting.  7. Weakness or dizziness.  GO DIRECTLY TO THE NEAREST EMERGENCY ROOM IF YOU HAVE ANY OF THE FOLLOWING:      Difficulty breathing              Chills and/or fever over 101 F   Persistent vomiting and/or vomiting blood   Severe abdominal pain   Severe chest pain   Black, tarry stools   Bleeding- more than one  tablespoon   Any other symptom or condition that you feel may need urgent attention  Your doctor recommends these additional instructions:  If any biopsies were taken, your doctors clinic will contact you in 1 to 2   weeks with any results.  - Discharge patient to home (via wheelchair).   - High fiber diet.   - Repeat colonoscopy in 10 years for screening purposes.   - Return to primary care physician as previously scheduled.   - Discharge patient to home (via wheelchair).   - High fiber diet.   - Repeat colonoscopy in 10 years for screening purposes.   - Return to primary care physician as previously scheduled.  For questions, problems or results please call your physician Kaiden Gallagher III, MD at Work:  [phone]  If you have any questions about the above instructions, call the GI   department at [phone] or call the endoscopy unit at [phone]   from 7am until 3 pm.  [facility], EMERGENCY ROOM PHONE NUMBER:   [phone]  IF A COMPLICATION OR EMERGENCY SITUATION ARISES AND YOU ARE UNABLE TO REACH   YOUR PHYSICIAN - GO DIRECTLY TO THE EMERGENCY ROOM.  I have read or have had read to me these discharge instructions for my   procedure and have received a written copy.  I understand these   instructions and will follow-up with my physician if I have any questions.     __________________________________       _____________________________________  Nurse Signature                                          Patient/Designated   Responsible Party Signature  Kaiden Gallagher III, MD  6/5/2019 8:49:17 AM  This report has been verified and signed electronically.  [employer]

## 2019-06-05 NOTE — H&P
Short Stay Endoscopy History and Physical    PCP - Sergio Quick MD    Procedure - Colonoscopy  ASA - 2  Mallampati - per anesthesia  History of Anesthesia problems - no  Family history Anesthesia problems -  no     HPI:  This is a 68 y.o.male here for evaluation of : Colon Cancer Screen    Reflux - no  Dysphagia - no  Abdominal pain - yes  Diarrhea - no  Anemia - no  GI bleeding - no  Nausea and vomiting-no  Early satiety-no  aversion to sight or smell of food-no    ROS:  Constitutional: No fevers, chills, No weight loss  ENT: No allergies  CV: No chest pain  Pulm: No cough, No shortness of breath  Ophtho: No vision changes  GI: see HPI  Derm: No rash  Heme: No lymphadenopathy, No bruising  MSK: No arthritis  : No dysuria, No hematuria  Endo: No hot or cold intolerance  Neuro: No syncope, No seizure  Psych: No anxiety, No depression    Medical History:  Past Medical History:   Diagnosis Date    Cancer skin cancer on head       Surgical History:  Past Surgical History:   Procedure Laterality Date    HERNIA REPAIR      SPINE SURGERY         Family History:  Family History   Problem Relation Age of Onset    Hypertension Mother     Cancer Father     Hypertension Maternal Grandmother     Thyroid disease Maternal Grandmother     Amblyopia Neg Hx     Blindness Neg Hx     Cataracts Neg Hx     Diabetes Neg Hx     Glaucoma Neg Hx     Macular degeneration Neg Hx     Retinal detachment Neg Hx     Strabismus Neg Hx     Stroke Neg Hx        Social History:  Social History     Socioeconomic History    Marital status:      Spouse name: Not on file    Number of children: Not on file    Years of education: Not on file    Highest education level: Not on file   Occupational History    Not on file   Social Needs    Financial resource strain: Not on file    Food insecurity:     Worry: Not on file     Inability: Not on file    Transportation needs:     Medical: Not on file     Non-medical: Not on file    Tobacco Use    Smoking status: Never Smoker    Smokeless tobacco: Never Used   Substance and Sexual Activity    Alcohol use: No    Drug use: No    Sexual activity: Yes     Partners: Female   Lifestyle    Physical activity:     Days per week: Not on file     Minutes per session: Not on file    Stress: Not on file   Relationships    Social connections:     Talks on phone: Not on file     Gets together: Not on file     Attends Confucianism service: Not on file     Active member of club or organization: Not on file     Attends meetings of clubs or organizations: Not on file     Relationship status: Not on file   Other Topics Concern    Not on file   Social History Narrative    Not on file       Allergies: Review of patient's allergies indicates:  No Known Allergies    Medications:   No current facility-administered medications on file prior to encounter.      Current Outpatient Medications on File Prior to Encounter   Medication Sig Dispense Refill    sodium,potassium,mag sulfates (SUPREP BOWEL PREP KIT) 17.5-3.13-1.6 gram SolR Take as instructed on prep sheet 354 mL 0       Objective Findings:    Vital Signs:There were no vitals filed for this visit.        Physical Exam:  General Appearance: Well appearing in no acute distress  Eyes:    No scleral icterus  ENT: Neck supple, Lips, mucosa, and tongue normal; teeth and gums normal  Lungs: CTA bilaterally in anterior and posterior fields, no wheezes, no crackles.  Heart:  Regular rate, S1, S2 normal, no murmurs heard.  Abdomen: Soft, non tender, non distended with normal bowel sounds. No hepatosplenomegaly, ascites, or mass.  Extremities: No clubbing, cyanosis or edema  Skin: No rash    Labs:  Reviewed    Plan:Colonoscopy  I have explained the risks and benefits of endoscopy procedures to the patient including but not limited to bleeding, perforation, infection, and death. The patient wishes to proceed.

## 2019-06-05 NOTE — DISCHARGE INSTRUCTIONS
Colonoscopy     A camera attached to a flexible tube with a viewing lens is used to take video pictures.     Colonoscopy is a test to view the inside of your lower digestive tract (colon and rectum). Sometimes it can show the last part of the small intestine (ileum). During the test, small pieces of tissue may be removed for testing. This is called a biopsy. Small growths, such as polyps, may also be removed.   Why is colonoscopy done?  The test is done to help look for colon cancer. And it can help find the source of abdominal pain, bleeding, and changes in bowel habits. It may be needed once a year, depending on factors such as your:  · Age  · Health history  · Family health history  · Symptoms  · Results from any prior colonoscopy  Risks and possible complications  These include:  · Bleeding               · A puncture or tear in the colon   · Risks of anesthesia  · A cancer lesion not being seen  Getting ready   To prepare for the test:  · Talk with your healthcare provider about the risks of the test (see below). Also ask your healthcare provider about alternatives to the test.  · Tell your healthcare provider about any medicines you take. Also tell him or her about any health conditions you may have.  · Make sure your rectum and colon are empty for the test. Follow the diet and bowel prep instructions exactly. If you dont, the test may need to be rescheduled.  · Plan for a friend or family member to drive you home after the test.     Colonoscopy provides an inside view of the entire colon.     You may discuss the results with your doctor right away or at a future visit.  During the test   The test is usually done in the hospital on an outpatient basis. This means you go home the same day. The procedure takes about 30 minutes. During that time:  · You are given relaxing (sedating) medicine through an IV line. You may be drowsy, or fully asleep.  · The healthcare provider will first give you a physical exam to  check for anal and rectal problems.  · Then the anus is lubricated and the scope inserted.  · If you are awake, you may have a feeling similar to needing to have a bowel movement. You may also feel pressure as air is pumped into the colon. Its OK to pass gas during the procedure.  · Biopsy, polyp removal, or other treatments may be done during the test.  After the test   You may have gas right after the test. It can help to try to pass it to help prevent later bloating. Your healthcare provider may discuss the results with you right away. Or you may need to schedule a follow-up visit to talk about the results. After the test, you can go back to your normal eating and other activities. You may be tired from the sedation and need to rest for a few hours.  Date Last Reviewed: 11/1/2016 © 2000-2017 [employer]. [address] [place] [address], [place] [address]. All rights reserved. This information is not intended as a substitute for professional medical care. Always follow your healthcare professional's instructions.

## 2019-06-05 NOTE — TRANSFER OF CARE
"Anesthesia Transfer of Care Note    Patient: Wilbert Luna    Procedure(s) Performed: Procedure(s) (LRB):  COLONOSCOPY (N/A)    Patient location: PACU    Anesthesia Type: MAC    Transport from OR: Transported from OR on room air with adequate spontaneous ventilation    Post pain: adequate analgesia    Post assessment: no apparent anesthetic complications and tolerated procedure well    Post vital signs: stable    Level of consciousness: awake    Nausea/Vomiting: no nausea/vomiting    Complications: none    Transfer of care protocol was followed      Last vitals:   Visit Vitals  /68 (BP Location: Left arm, Patient Position: Sitting)   Pulse (!) 51   Temp 36.7 °C (98.1 °F) (Temporal)   Resp 18   Ht 5' 11" (1.803 m)   Wt 91.2 kg (201 lb 1 oz)   SpO2 98%   BMI 28.04 kg/m²     "

## 2019-06-05 NOTE — ANESTHESIA POSTPROCEDURE EVALUATION
Anesthesia Post Evaluation    Patient: Wilbert Luna    Procedure(s) Performed: Procedure(s) (LRB):  COLONOSCOPY (N/A)    Final Anesthesia Type: MAC  Patient location during evaluation: PACU  Patient participation: Yes- Able to Participate  Level of consciousness: responds to stimulation  Post-procedure vital signs: reviewed and stable  Pain management: adequate  Airway patency: patent  PONV status at discharge: No PONV  Anesthetic complications: no      Cardiovascular status: blood pressure returned to baseline  Respiratory status: unassisted  Hydration status: euvolemic  Follow-up not needed.          Vitals Value Taken Time   BP 93/65 6/5/2019  8:45 AM   Temp 36.7 °C (98.1 °F) 6/5/2019  7:26 AM   Pulse 57 6/5/2019  8:45 AM   Resp 14 6/5/2019  8:45 AM   SpO2 96 % 6/5/2019  8:45 AM         No case tracking events are documented in the log.      Pain/Fabian Score: No data recorded

## 2019-06-05 NOTE — PLAN OF CARE
Elliott EID at bedside. Discussed procedure results with patient and family. Vital signs stable.

## 2019-06-06 VITALS
SYSTOLIC BLOOD PRESSURE: 105 MMHG | BODY MASS INDEX: 28.15 KG/M2 | OXYGEN SATURATION: 99 % | DIASTOLIC BLOOD PRESSURE: 69 MMHG | TEMPERATURE: 98 F | WEIGHT: 201.06 LBS | RESPIRATION RATE: 14 BRPM | HEART RATE: 57 BPM | HEIGHT: 71 IN

## 2019-07-15 ENCOUNTER — OFFICE VISIT (OUTPATIENT)
Dept: FAMILY MEDICINE | Facility: CLINIC | Age: 69
End: 2019-07-15
Payer: MEDICARE

## 2019-07-15 VITALS
SYSTOLIC BLOOD PRESSURE: 117 MMHG | BODY MASS INDEX: 28.98 KG/M2 | DIASTOLIC BLOOD PRESSURE: 70 MMHG | HEART RATE: 52 BPM | TEMPERATURE: 98 F | HEIGHT: 71 IN | WEIGHT: 207 LBS

## 2019-07-15 DIAGNOSIS — J32.9 SINUSITIS, UNSPECIFIED CHRONICITY, UNSPECIFIED LOCATION: Primary | ICD-10-CM

## 2019-07-15 DIAGNOSIS — J02.9 PHARYNGITIS, UNSPECIFIED ETIOLOGY: ICD-10-CM

## 2019-07-15 LAB — DEPRECATED S PYO AG THROAT QL EIA: NEGATIVE

## 2019-07-15 PROCEDURE — 99999 PR PBB SHADOW E&M-EST. PATIENT-LVL III: ICD-10-PCS | Mod: PBBFAC,,, | Performed by: NURSE PRACTITIONER

## 2019-07-15 PROCEDURE — 99213 PR OFFICE/OUTPT VISIT, EST, LEVL III, 20-29 MIN: ICD-10-PCS | Mod: 25,S$GLB,, | Performed by: NURSE PRACTITIONER

## 2019-07-15 PROCEDURE — 99999 PR PBB SHADOW E&M-EST. PATIENT-LVL III: CPT | Mod: PBBFAC,,, | Performed by: NURSE PRACTITIONER

## 2019-07-15 PROCEDURE — 99213 OFFICE O/P EST LOW 20 MIN: CPT | Mod: 25,S$GLB,, | Performed by: NURSE PRACTITIONER

## 2019-07-15 PROCEDURE — 87081 CULTURE SCREEN ONLY: CPT

## 2019-07-15 PROCEDURE — 1101F PR PT FALLS ASSESS DOC 0-1 FALLS W/OUT INJ PAST YR: ICD-10-PCS | Mod: S$GLB,,, | Performed by: NURSE PRACTITIONER

## 2019-07-15 PROCEDURE — 96372 THER/PROPH/DIAG INJ SC/IM: CPT | Mod: S$GLB,,, | Performed by: NURSE PRACTITIONER

## 2019-07-15 PROCEDURE — 87880 STREP A ASSAY W/OPTIC: CPT | Mod: PO

## 2019-07-15 PROCEDURE — 96372 PR INJECTION,THERAP/PROPH/DIAG2ST, IM OR SUBCUT: ICD-10-PCS | Mod: S$GLB,,, | Performed by: NURSE PRACTITIONER

## 2019-07-15 PROCEDURE — 1101F PT FALLS ASSESS-DOCD LE1/YR: CPT | Mod: S$GLB,,, | Performed by: NURSE PRACTITIONER

## 2019-07-15 RX ORDER — PROMETHAZINE HYDROCHLORIDE AND DEXTROMETHORPHAN HYDROBROMIDE 6.25; 15 MG/5ML; MG/5ML
5 SYRUP ORAL 2 TIMES DAILY PRN
Qty: 118 ML | Refills: 0 | Status: SHIPPED | OUTPATIENT
Start: 2019-07-15 | End: 2019-07-25

## 2019-07-15 RX ORDER — DEXAMETHASONE SODIUM PHOSPHATE 4 MG/ML
8 INJECTION, SOLUTION INTRA-ARTICULAR; INTRALESIONAL; INTRAMUSCULAR; INTRAVENOUS; SOFT TISSUE ONCE
Status: COMPLETED | OUTPATIENT
Start: 2019-07-15 | End: 2019-07-15

## 2019-07-15 RX ORDER — AZITHROMYCIN 250 MG/1
TABLET, FILM COATED ORAL
Qty: 6 TABLET | Refills: 0 | Status: SHIPPED | OUTPATIENT
Start: 2019-07-15 | End: 2019-07-20

## 2019-07-15 RX ADMIN — DEXAMETHASONE SODIUM PHOSPHATE 8 MG: 4 INJECTION, SOLUTION INTRA-ARTICULAR; INTRALESIONAL; INTRAMUSCULAR; INTRAVENOUS; SOFT TISSUE at 02:07

## 2019-07-15 NOTE — PROGRESS NOTES
Subjective:       Patient ID: Wilbert Luna is a 68 y.o. male.    Chief Complaint: Cough and Sore Throat    Sinus Problem   This is a new problem. The current episode started 1 to 4 weeks ago (x 2 w). The problem is unchanged. There has been no fever. The pain is moderate. Associated symptoms include congestion, coughing, headaches, sinus pressure and a sore throat. Pertinent negatives include no chills, diaphoresis, ear pain, hoarse voice, neck pain, shortness of breath, sneezing or swollen glands. Past treatments include nothing. The treatment provided no relief.     Past Medical History:   Diagnosis Date    Cancer skin cancer on head     Social History     Socioeconomic History    Marital status:      Spouse name: Not on file    Number of children: Not on file    Years of education: Not on file    Highest education level: Not on file   Occupational History    Not on file   Social Needs    Financial resource strain: Not on file    Food insecurity:     Worry: Not on file     Inability: Not on file    Transportation needs:     Medical: Not on file     Non-medical: Not on file   Tobacco Use    Smoking status: Never Smoker    Smokeless tobacco: Never Used   Substance and Sexual Activity    Alcohol use: No    Drug use: No    Sexual activity: Yes     Partners: Female   Lifestyle    Physical activity:     Days per week: Not on file     Minutes per session: Not on file    Stress: Not on file   Relationships    Social connections:     Talks on phone: Not on file     Gets together: Not on file     Attends Moravian service: Not on file     Active member of club or organization: Not on file     Attends meetings of clubs or organizations: Not on file     Relationship status: Not on file   Other Topics Concern    Not on file   Social History Narrative    Not on file     Past Surgical History:   Procedure Laterality Date    COLONOSCOPY N/A 6/5/2019    Performed by [facility]iden Gallagher III, MD at [facility]  ENDO    HERNIA REPAIR      SPINE SURGERY         Review of Systems   Constitutional: Negative.  Negative for chills and diaphoresis.   HENT: Positive for congestion, postnasal drip, rhinorrhea, sinus pressure, sinus pain and sore throat. Negative for ear pain, hoarse voice and sneezing.    Eyes: Negative.    Respiratory: Positive for cough. Negative for shortness of breath.    Cardiovascular: Negative.    Gastrointestinal: Negative.    Endocrine: Negative.    Genitourinary: Negative.    Musculoskeletal: Negative.  Negative for neck pain.   Skin: Negative.    Allergic/Immunologic: Negative.    Neurological: Positive for headaches.   Psychiatric/Behavioral: Negative.        Objective:      Physical Exam   Constitutional: He is oriented to person, place, and time. He appears well-developed and well-nourished.   HENT:   Head: Normocephalic.   Right Ear: Hearing, tympanic membrane, external ear and ear canal normal.   Left Ear: Hearing, tympanic membrane, external ear and ear canal normal.   Nose: Mucosal edema and rhinorrhea present. Right sinus exhibits maxillary sinus tenderness. Right sinus exhibits no frontal sinus tenderness. Left sinus exhibits maxillary sinus tenderness. Left sinus exhibits no frontal sinus tenderness.   Mouth/Throat: Uvula is midline and mucous membranes are normal. Posterior oropharyngeal erythema present.   Eyes: Pupils are equal, round, and reactive to light. Conjunctivae are normal.   Neck: Normal range of motion. Neck supple.   Cardiovascular: Normal rate, regular rhythm and normal heart sounds.   Pulmonary/Chest: Effort normal and breath sounds normal.   Abdominal: Soft. Bowel sounds are normal.   Musculoskeletal: Normal range of motion.   Neurological: He is alert and oriented to person, place, and time.   Skin: Skin is warm and dry. Capillary refill takes 2 to 3 seconds.   Psychiatric: He has a normal mood and affect. His behavior is normal. Judgment and thought content normal.    Nursing note and vitals reviewed.      Assessment:       1. Sinusitis, unspecified chronicity, unspecified location    2. Pharyngitis, unspecified etiology        Plan:           Wilbert was seen today for cough and sore throat.    Diagnoses and all orders for this visit:    Sinusitis, unspecified chronicity, unspecified location  Pharyngitis, unspecified etiology  -     Throat Screen, Rapid  -     promethazine-dextromethorphan (PROMETHAZINE-DM) 6.25-15 mg/5 mL Syrp; Take 5 mLs by mouth 2 (two) times daily as needed.  -     azithromycin (Z-TOMÁS) 250 MG tablet; Take 2 tablets by mouth on day 1; Take 1 tablet by mouth on days 2-5  -     dexamethasone injection 8 mg  -     Strep A culture, throat  Report to ER immediately if symptoms worsen  Warm salt water gargles PRN

## 2019-07-18 LAB — BACTERIA THROAT CULT: NORMAL

## 2019-09-30 ENCOUNTER — OFFICE VISIT (OUTPATIENT)
Dept: FAMILY MEDICINE | Facility: CLINIC | Age: 69
End: 2019-09-30
Payer: MEDICARE

## 2019-09-30 VITALS
DIASTOLIC BLOOD PRESSURE: 76 MMHG | HEIGHT: 71 IN | BODY MASS INDEX: 28.7 KG/M2 | HEART RATE: 70 BPM | TEMPERATURE: 98 F | WEIGHT: 205 LBS | SYSTOLIC BLOOD PRESSURE: 116 MMHG

## 2019-09-30 DIAGNOSIS — H61.22 IMPACTED CERUMEN, LEFT EAR: Primary | ICD-10-CM

## 2019-09-30 DIAGNOSIS — H92.02 OTALGIA, LEFT: ICD-10-CM

## 2019-09-30 PROCEDURE — 99999 PR PBB SHADOW E&M-EST. PATIENT-LVL IV: CPT | Mod: PBBFAC,,, | Performed by: NURSE PRACTITIONER

## 2019-09-30 PROCEDURE — 1101F PT FALLS ASSESS-DOCD LE1/YR: CPT | Mod: S$GLB,,, | Performed by: NURSE PRACTITIONER

## 2019-09-30 PROCEDURE — 99213 PR OFFICE/OUTPT VISIT, EST, LEVL III, 20-29 MIN: ICD-10-PCS | Mod: S$GLB,,, | Performed by: NURSE PRACTITIONER

## 2019-09-30 PROCEDURE — 99999 PR PBB SHADOW E&M-EST. PATIENT-LVL IV: ICD-10-PCS | Mod: PBBFAC,,, | Performed by: NURSE PRACTITIONER

## 2019-09-30 PROCEDURE — 1101F PR PT FALLS ASSESS DOC 0-1 FALLS W/OUT INJ PAST YR: ICD-10-PCS | Mod: S$GLB,,, | Performed by: NURSE PRACTITIONER

## 2019-09-30 PROCEDURE — 99213 OFFICE O/P EST LOW 20 MIN: CPT | Mod: S$GLB,,, | Performed by: NURSE PRACTITIONER

## 2019-09-30 NOTE — PROGRESS NOTES
Subjective:       Patient ID: Wilbert Luna is a 68 y.o. male.    Chief Complaint: Otalgia and Ear Fullness    Ear Fullness    There is pain in the left ear. This is a new problem. The current episode started in the past 7 days. The problem occurs constantly. The problem has been unchanged. There has been no fever. The pain is mild. Pertinent negatives include no abdominal pain, coughing, diarrhea, ear discharge, headaches, hearing loss, neck pain, rash, rhinorrhea, sore throat or vomiting. He has tried nothing for the symptoms. The treatment provided no relief. There is no history of a chronic ear infection, hearing loss or a tympanostomy tube.     Past Medical History:   Diagnosis Date    Cancer skin cancer on head     Social History     Socioeconomic History    Marital status:      Spouse name: Not on file    Number of children: Not on file    Years of education: Not on file    Highest education level: Not on file   Occupational History    Not on file   Social Needs    Financial resource strain: Not on file    Food insecurity:     Worry: Not on file     Inability: Not on file    Transportation needs:     Medical: Not on file     Non-medical: Not on file   Tobacco Use    Smoking status: Never Smoker    Smokeless tobacco: Never Used   Substance and Sexual Activity    Alcohol use: No    Drug use: No    Sexual activity: Yes     Partners: Female   Lifestyle    Physical activity:     Days per week: Not on file     Minutes per session: Not on file    Stress: Not on file   Relationships    Social connections:     Talks on phone: Not on file     Gets together: Not on file     Attends Pentecostal service: Not on file     Active member of club or organization: Not on file     Attends meetings of clubs or organizations: Not on file     Relationship status: Not on file   Other Topics Concern    Not on file   Social History Narrative    Not on file     Past Surgical History:   Procedure Laterality  Date    COLONOSCOPY N/A 6/5/2019    Procedure: COLONOSCOPY;  Surgeon: Kaiden Gallagher III, MD;  Location: [facility];  Service: Endoscopy;  Laterality: N/A;    HERNIA REPAIR      SPINE SURGERY         Review of Systems   Constitutional: Negative.    HENT: Negative for ear discharge, hearing loss, rhinorrhea and sore throat.         Left ear fullness   Eyes: Negative.    Respiratory: Negative.  Negative for cough.    Cardiovascular: Negative.    Gastrointestinal: Negative.  Negative for abdominal pain, diarrhea and vomiting.   Endocrine: Negative.    Genitourinary: Negative.    Musculoskeletal: Negative.  Negative for neck pain.   Skin: Negative.  Negative for rash.   Allergic/Immunologic: Negative.    Neurological: Negative.  Negative for headaches.   Psychiatric/Behavioral: Negative.        Objective:      Physical Exam   Constitutional: He is oriented to person, place, and time. He appears well-developed and well-nourished.   HENT:   Head: Normocephalic.   Right Ear: Hearing, tympanic membrane, external ear and ear canal normal.   Left Ear: Hearing, external ear and ear canal normal.   Nose: Nose normal.   Mouth/Throat: Uvula is midline, oropharynx is clear and moist and mucous membranes are normal.   Impacted cerumen to left ear prior to ear wash   Eyes: Pupils are equal, round, and reactive to light. Conjunctivae are normal.   Neck: Normal range of motion. Neck supple.   Cardiovascular: Normal rate, regular rhythm and normal heart sounds.   Pulmonary/Chest: Effort normal and breath sounds normal.   Abdominal: Soft. Bowel sounds are normal.   Musculoskeletal: Normal range of motion.   Neurological: He is alert and oriented to person, place, and time.   Skin: Skin is warm and dry. Capillary refill takes 2 to 3 seconds.   Psychiatric: He has a normal mood and affect. His behavior is normal. Judgment and thought content normal.   Nursing note and vitals reviewed.      Assessment:       1. Impacted cerumen, left  ear    2. Otalgia, left        Plan:           Wilbert was seen today for otalgia and ear fullness.    Diagnoses and all orders for this visit:    Impacted cerumen, left ear  Otalgia, left  -     Ambulatory referral to ENT  -     Ear wax removal  Report to ER immediately if symptoms worsen  Debrox OTC as directed

## 2020-04-14 ENCOUNTER — PATIENT MESSAGE (OUTPATIENT)
Dept: FAMILY MEDICINE | Facility: CLINIC | Age: 70
End: 2020-04-14

## 2020-04-14 ENCOUNTER — OFFICE VISIT (OUTPATIENT)
Dept: FAMILY MEDICINE | Facility: CLINIC | Age: 70
End: 2020-04-14
Payer: MEDICARE

## 2020-04-14 DIAGNOSIS — H93.8X2 EAR FULLNESS, LEFT: ICD-10-CM

## 2020-04-14 DIAGNOSIS — H92.02 LEFT EAR PAIN: Primary | ICD-10-CM

## 2020-04-14 PROCEDURE — 1101F PR PT FALLS ASSESS DOC 0-1 FALLS W/OUT INJ PAST YR: ICD-10-PCS | Mod: ,,, | Performed by: NURSE PRACTITIONER

## 2020-04-14 PROCEDURE — 99213 OFFICE O/P EST LOW 20 MIN: CPT | Mod: 95,,, | Performed by: NURSE PRACTITIONER

## 2020-04-14 PROCEDURE — 99213 PR OFFICE/OUTPT VISIT, EST, LEVL III, 20-29 MIN: ICD-10-PCS | Mod: 95,,, | Performed by: NURSE PRACTITIONER

## 2020-04-14 PROCEDURE — 1101F PT FALLS ASSESS-DOCD LE1/YR: CPT | Mod: ,,, | Performed by: NURSE PRACTITIONER

## 2020-04-14 PROCEDURE — 1159F PR MEDICATION LIST DOCUMENTED IN MEDICAL RECORD: ICD-10-PCS | Mod: ,,, | Performed by: NURSE PRACTITIONER

## 2020-04-14 PROCEDURE — 1159F MED LIST DOCD IN RCRD: CPT | Mod: ,,, | Performed by: NURSE PRACTITIONER

## 2020-04-14 RX ORDER — OFLOXACIN 3 MG/ML
10 SOLUTION AURICULAR (OTIC) DAILY
Qty: 5 ML | Refills: 0 | Status: SHIPPED | OUTPATIENT
Start: 2020-04-14 | End: 2020-04-21

## 2020-04-14 NOTE — PROGRESS NOTES
Subjective:       Patient ID: Wilbert Luna is a 69 y.o. male.    Chief Complaint: No chief complaint on file.  The patient location is: Pt's home  The chief complaint leading to consultation is: Left ear fullness and mild otalgia  Visit type: audiovisual  Total time spent with patient: 15 min  Each patient to whom he or she provides medical services by telemedicine is:  (1) informed of the relationship between the physician and patient and the respective role of any other health care provider with respect to management of the patient; and (2) notified that he or she may decline to receive medical services by telemedicine and may withdraw from such care at any time.  Otalgia    There is pain in the left ear. This is a new problem. The current episode started in the past 7 days. The problem occurs constantly. The problem has been unchanged. There has been no fever. The pain is mild. Pertinent negatives include no abdominal pain, coughing, diarrhea, ear discharge, headaches, hearing loss, neck pain, rash, rhinorrhea, sore throat or vomiting. Treatments tried: States flushed with peroxide. The treatment provided no relief. There is no history of a chronic ear infection, hearing loss or a tympanostomy tube.     Past Medical History:   Diagnosis Date    Cancer skin cancer on head     Social History     Socioeconomic History    Marital status:      Spouse name: Not on file    Number of children: Not on file    Years of education: Not on file    Highest education level: Not on file   Occupational History    Not on file   Social Needs    Financial resource strain: Not on file    Food insecurity:     Worry: Not on file     Inability: Not on file    Transportation needs:     Medical: Not on file     Non-medical: Not on file   Tobacco Use    Smoking status: Never Smoker    Smokeless tobacco: Never Used   Substance and Sexual Activity    Alcohol use: No    Drug use: No    Sexual activity: Yes      Partners: Female   Lifestyle    Physical activity:     Days per week: Not on file     Minutes per session: Not on file    Stress: Not on file   Relationships    Social connections:     Talks on phone: Not on file     Gets together: Not on file     Attends Scientology service: Not on file     Active member of club or organization: Not on file     Attends meetings of clubs or organizations: Not on file     Relationship status: Not on file   Other Topics Concern    Not on file   Social History Narrative    Not on file     Past Surgical History:   Procedure Laterality Date    COLONOSCOPY N/A 6/5/2019    Procedure: COLONOSCOPY;  Surgeon: Kaiden Gallagher III, MD;  Location: [facility];  Service: Endoscopy;  Laterality: N/A;    HERNIA REPAIR      SPINE SURGERY         Review of Systems   Constitutional: Negative.    HENT: Positive for ear pain (ear fullness). Negative for ear discharge, hearing loss, rhinorrhea and sore throat.    Eyes: Negative.    Respiratory: Negative.  Negative for cough.    Cardiovascular: Negative.    Gastrointestinal: Negative.  Negative for abdominal pain, diarrhea and vomiting.   Endocrine: Negative.    Genitourinary: Negative.    Musculoskeletal: Negative.  Negative for neck pain.   Skin: Negative.  Negative for rash.   Allergic/Immunologic: Negative.    Neurological: Negative.  Negative for headaches.   Psychiatric/Behavioral: Negative.        Objective:      Physical Exam   Constitutional: He appears well-developed and well-nourished.    Alert and oriented, appropriate affect Behavior: normal Speech: appropriate quality, quantity and organization of sentences Thought content: normal and Affect: euthymic           Assessment:       1. Left ear pain    2. Ear fullness, left        Plan:           Diagnoses and all orders for this visit:    Left ear pain  Ear fullness, left  -     ofloxacin (FLOXIN) 0.3 % otic solution; Place 10 drops into the left ear once daily. for 7 days  Debrox OTC as  directed; recommend trying x 1 before starting ofloxin gtts        Report to ER immediately if symptoms worsen

## 2020-08-03 ENCOUNTER — OFFICE VISIT (OUTPATIENT)
Dept: FAMILY MEDICINE | Facility: CLINIC | Age: 70
End: 2020-08-03
Payer: MEDICARE

## 2020-08-03 ENCOUNTER — TELEPHONE (OUTPATIENT)
Dept: FAMILY MEDICINE | Facility: CLINIC | Age: 70
End: 2020-08-03

## 2020-08-03 ENCOUNTER — LAB VISIT (OUTPATIENT)
Dept: LAB | Facility: HOSPITAL | Age: 70
End: 2020-08-03
Attending: FAMILY MEDICINE
Payer: MEDICARE

## 2020-08-03 DIAGNOSIS — M79.10 MYALGIA: Primary | ICD-10-CM

## 2020-08-03 DIAGNOSIS — M79.10 MYALGIA: ICD-10-CM

## 2020-08-03 LAB
BASOPHILS # BLD AUTO: 0.05 K/UL (ref 0–0.2)
BASOPHILS NFR BLD: 0.5 % (ref 0–1.9)
DIFFERENTIAL METHOD: ABNORMAL
EOSINOPHIL # BLD AUTO: 0.4 K/UL (ref 0–0.5)
EOSINOPHIL NFR BLD: 3.4 % (ref 0–8)
ERYTHROCYTE [DISTWIDTH] IN BLOOD BY AUTOMATED COUNT: 12.4 % (ref 11.5–14.5)
ERYTHROCYTE [SEDIMENTATION RATE] IN BLOOD BY WESTERGREN METHOD: 26 MM/HR (ref 0–10)
HCT VFR BLD AUTO: 43.3 % (ref 40–54)
HGB BLD-MCNC: 13.7 G/DL (ref 14–18)
IMM GRANULOCYTES # BLD AUTO: 0.04 K/UL (ref 0–0.04)
IMM GRANULOCYTES NFR BLD AUTO: 0.4 % (ref 0–0.5)
LYMPHOCYTES # BLD AUTO: 2.5 K/UL (ref 1–4.8)
LYMPHOCYTES NFR BLD: 23.8 % (ref 18–48)
MCH RBC QN AUTO: 28.6 PG (ref 27–31)
MCHC RBC AUTO-ENTMCNC: 31.6 G/DL (ref 32–36)
MCV RBC AUTO: 90 FL (ref 82–98)
MONOCYTES # BLD AUTO: 1 K/UL (ref 0.3–1)
MONOCYTES NFR BLD: 9.6 % (ref 4–15)
NEUTROPHILS # BLD AUTO: 6.5 K/UL (ref 1.8–7.7)
NEUTROPHILS NFR BLD: 62.3 % (ref 38–73)
NRBC BLD-RTO: 0 /100 WBC
PLATELET # BLD AUTO: 314 K/UL (ref 150–350)
PMV BLD AUTO: 10.2 FL (ref 9.2–12.9)
RBC # BLD AUTO: 4.79 M/UL (ref 4.6–6.2)
WBC # BLD AUTO: 10.5 K/UL (ref 3.9–12.7)

## 2020-08-03 PROCEDURE — 1101F PR PT FALLS ASSESS DOC 0-1 FALLS W/OUT INJ PAST YR: ICD-10-PCS | Mod: ,,, | Performed by: FAMILY MEDICINE

## 2020-08-03 PROCEDURE — 86140 C-REACTIVE PROTEIN: CPT

## 2020-08-03 PROCEDURE — 82550 ASSAY OF CK (CPK): CPT

## 2020-08-03 PROCEDURE — 99214 PR OFFICE/OUTPT VISIT, EST, LEVL IV, 30-39 MIN: ICD-10-PCS | Mod: 95,,, | Performed by: FAMILY MEDICINE

## 2020-08-03 PROCEDURE — 80053 COMPREHEN METABOLIC PANEL: CPT

## 2020-08-03 PROCEDURE — 1159F MED LIST DOCD IN RCRD: CPT | Mod: ,,, | Performed by: FAMILY MEDICINE

## 2020-08-03 PROCEDURE — 36415 COLL VENOUS BLD VENIPUNCTURE: CPT | Mod: PO

## 2020-08-03 PROCEDURE — 1101F PT FALLS ASSESS-DOCD LE1/YR: CPT | Mod: ,,, | Performed by: FAMILY MEDICINE

## 2020-08-03 PROCEDURE — 1159F PR MEDICATION LIST DOCUMENTED IN MEDICAL RECORD: ICD-10-PCS | Mod: ,,, | Performed by: FAMILY MEDICINE

## 2020-08-03 PROCEDURE — 85025 COMPLETE CBC W/AUTO DIFF WBC: CPT

## 2020-08-03 PROCEDURE — 99214 OFFICE O/P EST MOD 30 MIN: CPT | Mod: 95,,, | Performed by: FAMILY MEDICINE

## 2020-08-03 PROCEDURE — 85651 RBC SED RATE NONAUTOMATED: CPT | Mod: PO

## 2020-08-03 RX ORDER — FLUTICASONE PROPIONATE 50 MCG
2 SPRAY, SUSPENSION (ML) NASAL DAILY
Status: ON HOLD | COMMUNITY
Start: 2020-07-09 | End: 2022-10-21 | Stop reason: HOSPADM

## 2020-08-03 RX ORDER — MELOXICAM 15 MG/1
15 TABLET ORAL DAILY
Qty: 30 TABLET | Refills: 0 | Status: SHIPPED | OUTPATIENT
Start: 2020-08-03 | End: 2021-08-03

## 2020-08-03 NOTE — TELEPHONE ENCOUNTER
----- Message from Analia Higgins sent at 8/3/2020  9:06 AM CDT -----  Regarding: appt  Patient requesting an appt as soon as possible for shoulder and thigh pain, but there are no openings until 9/23/20, was offered another provider but declined, please call him back at [phone]

## 2020-08-04 DIAGNOSIS — M35.3 POLYMYALGIA: Primary | ICD-10-CM

## 2020-08-04 LAB
ALBUMIN SERPL BCP-MCNC: 3.7 G/DL (ref 3.5–5.2)
ALP SERPL-CCNC: 94 U/L (ref 55–135)
ALT SERPL W/O P-5'-P-CCNC: 21 U/L (ref 10–44)
ANION GAP SERPL CALC-SCNC: 7 MMOL/L (ref 8–16)
AST SERPL-CCNC: 23 U/L (ref 10–40)
BILIRUB SERPL-MCNC: 0.3 MG/DL (ref 0.1–1)
BUN SERPL-MCNC: 17 MG/DL (ref 8–23)
CALCIUM SERPL-MCNC: 9.3 MG/DL (ref 8.7–10.5)
CHLORIDE SERPL-SCNC: 103 MMOL/L (ref 95–110)
CK SERPL-CCNC: 79 U/L (ref 20–200)
CO2 SERPL-SCNC: 30 MMOL/L (ref 23–29)
CREAT SERPL-MCNC: 1.5 MG/DL (ref 0.5–1.4)
CRP SERPL-MCNC: 59.4 MG/L (ref 0–8.2)
EST. GFR  (AFRICAN AMERICAN): 54.9 ML/MIN/1.73 M^2
EST. GFR  (NON AFRICAN AMERICAN): 47.5 ML/MIN/1.73 M^2
GLUCOSE SERPL-MCNC: 85 MG/DL (ref 70–110)
POTASSIUM SERPL-SCNC: 4.8 MMOL/L (ref 3.5–5.1)
PROT SERPL-MCNC: 7.3 G/DL (ref 6–8.4)
SODIUM SERPL-SCNC: 140 MMOL/L (ref 136–145)

## 2020-08-04 RX ORDER — PREDNISONE 5 MG/1
TABLET ORAL
Qty: 45 TABLET | Refills: 0 | Status: SHIPPED | OUTPATIENT
Start: 2020-08-04 | End: 2020-08-25

## 2020-08-04 NOTE — PROGRESS NOTES
Primary Care Telemedicine Note   The patient location is:  Patient Home    The chief complaint leading to consultation is: muscle aches   Total time spent with patient: 15 min   Visit type: Virtual visit with synchronous audio only and video   Each patient to whom he or she provides medical services by telemedicine is:  (1) informed of the relationship between the physician and patient and the respective role of any other health care provider with respect to management of the patient; and (2) notified that he or she may decline to receive medical services by telemedicine and may withdraw from such care at any time.       CC:As Above   HPI:  1 1/2 weeks ago, he fished and he states that he did get pretty hot.  He has since had problems with increased myalgia all over his body but it is greates in the back, buttocks and upper legs and shoulders.  He has not had medicine for this.  He has been drinking since then.  This came on suddenly he states.  He has not had any other symptoms like fever or chills and no rhintiis, sore throat, cough, sob, nausea, diarrhea, dysuria or hematuria noted.      Problem List Items Addressed This Visit     None      Visit Diagnoses     Myalgia    -  Primary    Relevant Medications    meloxicam (MOBIC) 15 MG tablet    Other Relevant Orders    CK    C-Reactive Protein    Sedimentation rate (Completed)    Comprehensive metabolic panel    CBC auto differential             The patient's Health Maintenance was reviewed and the following appears to be due at this time:   Health Maintenance Due   Topic Date Due    TETANUS VACCINE  12/31/1970    Shingles Vaccine (1 of 2) 12/31/2002    Pneumococcal Vaccine (65+ Low/Medium Risk) (2 of 2 - PPSV23) 04/29/2020          Outpatient Medications Prior to Visit   Medication Sig Dispense Refill    fluticasone propionate (FLONASE) 50 mcg/actuation nasal spray 2 sprays by Each Nostril route once daily.       No facility-administered medications prior to  visit.        PMH:  Past Medical History:   Diagnosis Date    Cancer skin cancer on head     Past Surgical History:   Procedure Laterality Date    COLONOSCOPY N/A 6/5/2019    Procedure: COLONOSCOPY;  Surgeon: Kaiden Gallagher III, MD;  Location: [facility];  Service: Endoscopy;  Laterality: N/A;    HERNIA REPAIR      SPINE SURGERY       Review of patient's allergies indicates:  No Known Allergies  Social History     Socioeconomic History    Marital status:      Spouse name: Not on file    Number of children: Not on file    Years of education: Not on file    Highest education level: Not on file   Occupational History    Not on file   Social Needs    Financial resource strain: Not on file    Food insecurity     Worry: Not on file     Inability: Not on file    Transportation needs     Medical: Not on file     Non-medical: Not on file   Tobacco Use    Smoking status: Never Smoker    Smokeless tobacco: Never Used   Substance and Sexual Activity    Alcohol use: No    Drug use: No    Sexual activity: Yes     Partners: Female   Lifestyle    Physical activity     Days per week: Not on file     Minutes per session: Not on file    Stress: Not on file   Relationships    Social connections     Talks on phone: Not on file     Gets together: Not on file     Attends Mandaeism service: Not on file     Active member of club or organization: Not on file     Attends meetings of clubs or organizations: Not on file     Relationship status: Not on file   Other Topics Concern    Not on file   Social History Narrative    Not on file     Family History   Problem Relation Age of Onset    Hypertension Mother     Cancer Father     Hypertension Maternal Grandmother     Thyroid disease Maternal Grandmother     Amblyopia Neg Hx     Blindness Neg Hx     Cataracts Neg Hx     Diabetes Neg Hx     Glaucoma Neg Hx     Macular degeneration Neg Hx     Retinal detachment Neg Hx     Strabismus Neg Hx     Stroke Neg  Hx            Review of Systems   HENT: Negative for hearing loss.    Eyes: Negative for discharge.   Respiratory: Negative for wheezing.    Cardiovascular: Negative for chest pain and palpitations.   Gastrointestinal: Positive for constipation. Negative for blood in stool, diarrhea and vomiting.   Genitourinary: Negative for hematuria and urgency.   Musculoskeletal: Positive for myalgias and neck pain.   Neurological: Positive for headaches. Negative for weakness.   Endo/Heme/Allergies: Negative for polydipsia.          Physical Exam    (Vitals taken at home and reported to us and documented)   Pulse If Self Reported:No flowsheet data found.    Last 5 Patient Entered Readings                                      Current 30 Day Average:      There is no flowsheet data to display.         BP If Self Reported:No flowsheet data found.   Pulse Readings from Last 3 Encounters:   09/30/19 70   07/15/19 (!) 52   06/05/19 (!) 57      Weight If Self Reported:No flowsheet data found.   Glucose If Self Reported:No flowsheet data found.   Last 6 Patient Entered Readings                                          Most Recent A1c:      There is no flowsheet data to display.         There were no vitals taken for this visit. if verbally reported and entered.    Constitutional: He is oriented to person, place, and time. He appears well-developed and well-nourished. No distress.    Eyes: EOM are normal.    Pulmonary/Chest: Effort normal. No respiratory distress.    Neurological: He is alert and oriented to person, place, and time.    Skin: He is not diaphoretic.    Psychiatric: He has a normal mood and affect. His behavior is normal. Judgment and thought content normal.        DIAGNOSIS  Encounter Diagnosis   Name Primary?    Myalgia Yes          PLAN:     I am having Wilbert Luna start on meloxicam. I am also having him maintain his fluticasone propionate.       Diagnoses and all orders for this visit:    Myalgia  -     CK;  Future  -     C-Reactive Protein; Future  -     Sedimentation rate; Future  -     Comprehensive metabolic panel; Future  -     CBC auto differential; Future  -     meloxicam (MOBIC) 15 MG tablet; Take 1 tablet (15 mg total) by mouth once daily.           Medications Ordered This Encounter   Medications    meloxicam (MOBIC) 15 MG tablet     Sig: Take 1 tablet (15 mg total) by mouth once daily.     Dispense:  30 tablet     Refill:  0      Orders Placed This Encounter   Procedures    CK     Standing Status:   Future     Number of Occurrences:   1     Standing Expiration Date:   10/2/2021    C-Reactive Protein     Standing Status:   Future     Number of Occurrences:   1     Standing Expiration Date:   10/2/2021    Sedimentation rate     Standing Status:   Future     Number of Occurrences:   1     Standing Expiration Date:   10/2/2021    Comprehensive metabolic panel     Standing Status:   Future     Number of Occurrences:   1     Standing Expiration Date:   8/3/2021    CBC auto differential     Standing Status:   Future     Number of Occurrences:   1     Standing Expiration Date:   10/2/2021          Answers for HPI/ROS submitted by the patient on 8/3/2020   activity change: Yes  unexpected weight change: No  rhinorrhea: No  trouble swallowing: No  visual disturbance: No  chest tightness: No  polyuria: No  difficulty urinating: No  joint swelling: No  arthralgias: No  confusion: No  dysphoric mood: No

## 2020-08-04 NOTE — PROGRESS NOTES
The inflammatory enzymes are high and the muscle breakdown product, the cpk, is normal.  This is good on the cpk.  Based on this, I would like to treat with a steroid and have you see one of the rheumatologists at [facility].  I will place orders for this.  Schedule this through my nurse.      Orders were placed.

## 2020-08-05 ENCOUNTER — TELEPHONE (OUTPATIENT)
Dept: FAMILY MEDICINE | Facility: CLINIC | Age: 70
End: 2020-08-05

## 2020-08-05 NOTE — TELEPHONE ENCOUNTER
----- Message from Abram Golden sent at 8/5/2020  9:58 AM CDT -----  Patient is returning a phone call.  Who left a message for the patient: He doesn't know  Does patient know what this is regarding:  No. But, he thinks it about the Referral to Rheumatology and he is not ready to schedule yet because, he wants to see how the medicine works and he is feeling better now.  Comments:

## 2020-08-06 ENCOUNTER — TELEPHONE (OUTPATIENT)
Dept: RHEUMATOLOGY | Facility: CLINIC | Age: 70
End: 2020-08-06

## 2020-08-06 NOTE — TELEPHONE ENCOUNTER
Called patient regarding referral from Dr. Quick, pt states that Dr. Quick gave him some medication and he is feeling better but if he needs to come see us then he will call to schedule.

## 2020-08-17 ENCOUNTER — TELEPHONE (OUTPATIENT)
Dept: FAMILY MEDICINE | Facility: CLINIC | Age: 70
End: 2020-08-17

## 2020-08-17 NOTE — TELEPHONE ENCOUNTER
----- Message from Cecilia Smith sent at 8/17/2020  8:25 AM CDT -----  Contact: [phone]  Would like to consult with the nurse, patient has some question concerning  the Dr name that he was referral to, patient would like to speak with the nurse concerning this, please call back, thanks sj

## 2020-08-17 NOTE — TELEPHONE ENCOUNTER
Spoke with patient and he states that he has started feeling bad again and is now ready to schedule his rheumatology appointment. Please call patient to schedule.

## 2020-08-17 NOTE — TELEPHONE ENCOUNTER
Spoke with pt and scheduled apt with Dr. Casper for 8.25.20 at 3 pm at [facility]. Pt verbalized understanding

## 2020-08-24 ENCOUNTER — PATIENT OUTREACH (OUTPATIENT)
Dept: ADMINISTRATIVE | Facility: OTHER | Age: 70
End: 2020-08-24

## 2020-08-25 ENCOUNTER — OFFICE VISIT (OUTPATIENT)
Dept: RHEUMATOLOGY | Facility: CLINIC | Age: 70
End: 2020-08-25
Payer: MEDICARE

## 2020-08-25 ENCOUNTER — LAB VISIT (OUTPATIENT)
Dept: LAB | Facility: HOSPITAL | Age: 70
End: 2020-08-25
Attending: INTERNAL MEDICINE
Payer: MEDICARE

## 2020-08-25 VITALS
HEART RATE: 63 BPM | HEIGHT: 71 IN | WEIGHT: 208.56 LBS | BODY MASS INDEX: 29.2 KG/M2 | SYSTOLIC BLOOD PRESSURE: 152 MMHG | DIASTOLIC BLOOD PRESSURE: 84 MMHG

## 2020-08-25 DIAGNOSIS — M35.3 POLYMYALGIA: Primary | ICD-10-CM

## 2020-08-25 DIAGNOSIS — Z71.89 COUNSELING ON HEALTH PROMOTION AND DISEASE PREVENTION: ICD-10-CM

## 2020-08-25 DIAGNOSIS — M35.3 POLYMYALGIA: ICD-10-CM

## 2020-08-25 LAB
CRP SERPL-MCNC: 64.1 MG/L (ref 0–8.2)
ERYTHROCYTE [SEDIMENTATION RATE] IN BLOOD BY WESTERGREN METHOD: 16 MM/HR (ref 0–23)

## 2020-08-25 PROCEDURE — 84165 PROTEIN E-PHORESIS SERUM: CPT | Mod: 26,,, | Performed by: PATHOLOGY

## 2020-08-25 PROCEDURE — 1101F PR PT FALLS ASSESS DOC 0-1 FALLS W/OUT INJ PAST YR: ICD-10-PCS | Mod: S$GLB,,, | Performed by: INTERNAL MEDICINE

## 2020-08-25 PROCEDURE — 86200 CCP ANTIBODY: CPT

## 2020-08-25 PROCEDURE — 86334 IMMUNOFIX E-PHORESIS SERUM: CPT | Mod: 26,,, | Performed by: PATHOLOGY

## 2020-08-25 PROCEDURE — 86334 PATHOLOGIST INTERPRETATION IFE: ICD-10-PCS | Mod: 26,,, | Performed by: PATHOLOGY

## 2020-08-25 PROCEDURE — 1125F PR PAIN SEVERITY QUANTIFIED, PAIN PRESENT: ICD-10-PCS | Mod: S$GLB,,, | Performed by: INTERNAL MEDICINE

## 2020-08-25 PROCEDURE — 3008F PR BODY MASS INDEX (BMI) DOCUMENTED: ICD-10-PCS | Mod: S$GLB,,, | Performed by: INTERNAL MEDICINE

## 2020-08-25 PROCEDURE — 84165 PROTEIN E-PHORESIS SERUM: CPT

## 2020-08-25 PROCEDURE — 1159F MED LIST DOCD IN RCRD: CPT | Mod: S$GLB,,, | Performed by: INTERNAL MEDICINE

## 2020-08-25 PROCEDURE — 86334 IMMUNOFIX E-PHORESIS SERUM: CPT

## 2020-08-25 PROCEDURE — 99999 PR PBB SHADOW E&M-EST. PATIENT-LVL IV: ICD-10-PCS | Mod: PBBFAC,,, | Performed by: INTERNAL MEDICINE

## 2020-08-25 PROCEDURE — 99999 PR PBB SHADOW E&M-EST. PATIENT-LVL IV: CPT | Mod: PBBFAC,,, | Performed by: INTERNAL MEDICINE

## 2020-08-25 PROCEDURE — 1101F PT FALLS ASSESS-DOCD LE1/YR: CPT | Mod: S$GLB,,, | Performed by: INTERNAL MEDICINE

## 2020-08-25 PROCEDURE — 1125F AMNT PAIN NOTED PAIN PRSNT: CPT | Mod: S$GLB,,, | Performed by: INTERNAL MEDICINE

## 2020-08-25 PROCEDURE — 99205 OFFICE O/P NEW HI 60 MIN: CPT | Mod: S$GLB,,, | Performed by: INTERNAL MEDICINE

## 2020-08-25 PROCEDURE — 3008F BODY MASS INDEX DOCD: CPT | Mod: S$GLB,,, | Performed by: INTERNAL MEDICINE

## 2020-08-25 PROCEDURE — 1159F PR MEDICATION LIST DOCUMENTED IN MEDICAL RECORD: ICD-10-PCS | Mod: S$GLB,,, | Performed by: INTERNAL MEDICINE

## 2020-08-25 PROCEDURE — 86431 RHEUMATOID FACTOR QUANT: CPT

## 2020-08-25 PROCEDURE — 86480 TB TEST CELL IMMUN MEASURE: CPT

## 2020-08-25 PROCEDURE — 36415 COLL VENOUS BLD VENIPUNCTURE: CPT

## 2020-08-25 PROCEDURE — 85652 RBC SED RATE AUTOMATED: CPT

## 2020-08-25 PROCEDURE — 83520 IMMUNOASSAY QUANT NOS NONAB: CPT

## 2020-08-25 PROCEDURE — 99205 PR OFFICE/OUTPT VISIT, NEW, LEVL V, 60-74 MIN: ICD-10-PCS | Mod: S$GLB,,, | Performed by: INTERNAL MEDICINE

## 2020-08-25 PROCEDURE — 84165 PATHOLOGIST INTERPRETATION SPE: ICD-10-PCS | Mod: 26,,, | Performed by: PATHOLOGY

## 2020-08-25 PROCEDURE — 86140 C-REACTIVE PROTEIN: CPT

## 2020-08-25 RX ORDER — PREDNISONE 5 MG/1
TABLET ORAL
Qty: 119 TABLET | Refills: 0 | Status: SHIPPED | OUTPATIENT
Start: 2020-08-25 | End: 2020-10-22

## 2020-08-25 RX ORDER — PREDNISONE 5 MG/1
TABLET ORAL
Qty: 56 TABLET | Refills: 0 | Status: SHIPPED | OUTPATIENT
Start: 2020-08-25 | End: 2020-08-25

## 2020-08-25 NOTE — PROGRESS NOTES
The patient's last blood pressure was high at the specialist's office.  Please book the patient with an NP to recheck and to address the blood pressure if it is still high.      Health Maintenance Due   Topic Date Due    Shingles Vaccine (1 of 2) 12/31/2002    Pneumococcal Vaccine (65+ Low/Medium Risk) (2 of 2 - PPSV23) 04/29/2020

## 2020-08-25 NOTE — PROGRESS NOTES
RHEUMATOLOGY OUTPATIENT CLINIC NOTE    8/25/2020    Attending Rheumatologist: Santos Casper  Primary Care Provider: Sergio Quick MD   Physician Requesting Consultation: Sergio Quick MD  [address] [address] [place] [address]  Chief Complaint/Reason For Consultation:  polymyalgia    Subjective:       HPI  Wilbert Luna is a 69 y.o. White male with proximal pain/stiffness and abnormal lab results, refer for rheumatic evaluation.  Symptom onset almost 1 month ago.  Describes proximal myalgias and generalized stiffness.  Denies any particular precipitating event and no prior episodes.  Work-up by PMD revealed elevation of CRP, provided with systemic corticosteroid taper from 40 mg daily which resolve his symptoms.  Refractory since off systemic corticosteroids.  Describes difficulty with activities of daily living due to pain and stiffness.  Denies joint swelling.  Does not have rash or fever.  Denies jaw claudication, new onset blurry vision, or headaches.    Review of Systems   Constitutional: Negative for chills, fever and malaise/fatigue.   HENT:        Denies jaw claudication.   Eyes: Negative for blurred vision, pain and redness.   Respiratory: Negative for cough, hemoptysis and shortness of breath.    Cardiovascular: Negative for chest pain and leg swelling.   Gastrointestinal: Negative for abdominal pain, blood in stool and melena.   Genitourinary: Negative for dysuria and hematuria.   Musculoskeletal: Positive for myalgias (Proximally all extremities.). Negative for falls and joint pain.   Skin: Negative for rash.   Neurological: Negative for tingling, focal weakness and headaches.   Psychiatric/Behavioral: Negative for memory loss. The patient does not have insomnia.      Chronic comorbid conditions affecting medical decision making today:  Past Medical History:   Diagnosis Date    Cancer skin cancer on head     Past Surgical History:   Procedure Laterality Date    COLONOSCOPY N/A 6/5/2019  "   Procedure: COLONOSCOPY;  Surgeon: Kaiden Gallagher III, MD;  Location: [facility];  Service: Endoscopy;  Laterality: N/A;    HERNIA REPAIR      SPINE SURGERY     · Cervical spinal fracture after trauma (branch fell on neck)  · LS surgery from DDD    Family History   Problem Relation Age of Onset    Hypertension Mother     Cancer Father     Hypertension Maternal Grandmother     Thyroid disease Maternal Grandmother     Amblyopia Neg Hx     Blindness Neg Hx     Cataracts Neg Hx     Diabetes Neg Hx     Glaucoma Neg Hx     Macular degeneration Neg Hx     Retinal detachment Neg Hx     Strabismus Neg Hx     Stroke Neg Hx      Social History     Substance and Sexual Activity   Alcohol Use No     Social History     Tobacco Use   Smoking Status Never Smoker   Smokeless Tobacco Never Used     Social History     Substance and Sexual Activity   Drug Use No       Current Outpatient Medications:     fluticasone propionate (FLONASE) 50 mcg/actuation nasal spray, 2 sprays by Each Nostril route once daily., Disp: , Rfl:     meloxicam (MOBIC) 15 MG tablet, Take 1 tablet (15 mg total) by mouth once daily., Disp: 30 tablet, Rfl: 0    predniSONE (DELTASONE) 5 MG tablet, Take 4 tablets (20 mg total) by mouth once daily for 7 days, THEN 3 tablets (15 mg total) once daily for 7 days, THEN 2.5 tablets (12.5 mg total) once daily for 10 days, THEN 2 tablets (10 mg total) once daily for 10 days, THEN 1.5 tablets (7.5 mg total) once daily for 10 days, THEN 1 tablet (5 mg total) once daily for 7 days, THEN 1 tablet (5 mg total) every other day for 7 days., Disp: 119 tablet, Rfl: 0     Objective:         Vitals:    08/25/20 1526   BP: (!) 152/84   Pulse: 63     Physical Exam   Constitutional: No distress.   Estimated body mass index is 29.09 kg/m² as calculated from the following:    Height as of this encounter: 5' 11" (1.803 m).    Weight as of this encounter: 94.6 kg (208 lb 8.9 oz).    Wt Readings from Last 1 " Encounters:  08/25/20 1526 : 94.6 kg (208 lb 8.9 oz)     HENT:   Head: Normocephalic and atraumatic.   Eyes: Conjunctivae are normal. Pupils are equal, round, and reactive to light.   Neck: Normal range of motion.   Cardiovascular: Normal rate and intact distal pulses.    Pulmonary/Chest: Effort normal. No respiratory distress.   Abdominal: Soft. He exhibits no distension.   Neurological: He is alert. Gait normal.   Muscle strength 5/5 proximally and distally throughout.   Skin: No rash noted. No erythema.     Musculoskeletal: Normal range of motion. Tenderness (Proximal musculature of extremities.) present.      Comments: : strong  No synovitis or significant squeeze tenderness    AROM:  Unable to abduct shoulders past 45° due to pain.  Otherwise intact  PROM:  As above    Devices used by patient: none       Reviewed old and all outside pertinent medical records available.    All lab results personally reviewed and interpreted by me.  Lab Results   Component Value Date    WBC 10.50 08/03/2020    HGB 13.7 (L) 08/03/2020    HCT 43.3 08/03/2020    MCV 90 08/03/2020    MCH 28.6 08/03/2020    MCHC 31.6 (L) 08/03/2020    RDW 12.4 08/03/2020     08/03/2020    MPV 10.2 08/03/2020       Lab Results   Component Value Date     08/03/2020    K 4.8 08/03/2020     08/03/2020    CO2 30 (H) 08/03/2020    GLU 85 08/03/2020    BUN 17 08/03/2020    CALCIUM 9.3 08/03/2020    PROT 7.3 08/03/2020    ALBUMIN 3.7 08/03/2020    BILITOT 0.3 08/03/2020    AST 23 08/03/2020    ALKPHOS 94 08/03/2020    ALT 21 08/03/2020       Lab Results   Component Value Date    COLORU Yellow 09/17/2016    APPEARANCEUA Clear 09/17/2016    SPECGRAV 1.020 09/17/2016    PHUR 6.0 09/17/2016    PROTEINUA 2+ (A) 09/17/2016    KETONESU Trace (A) 09/17/2016    LEUKOCYTESUR Negative 09/17/2016    NITRITE Negative 09/17/2016    UROBILINOGEN 2.0-3.0 (A) 09/17/2016       Lab Results   Component Value Date    CRP 59.4 (H) 08/03/2020       Lab  Results   Component Value Date    SEDRATE 26 (H) 08/03/2020       Lab Results   Component Value Date    SEDRATE 26 (H) 08/03/2020       No components found for: 25OHVITDTOT, 84LTTOPB6, 74DPFHIO7, METHODNOTE    No results found for: URICACID    No components found for: TSPOTTB    Rheum Labs:   n/a     Infectious Labs:   HCV NR 4/2019     Imaging:  All imaging reviewed and independently  interpreted by me.    MRI T-spine June 2014  Acute uperior endplate fracture of the T12 vertebral body without significant associated retropulsion of fracture fragments, central canal stenosis or abnormal cord signal.    X-ray C-spine July 2014   C7 spinous process fracture with additional T1 spinous process fracture     Chest x-ray September 2014  The heart is within normal limits in size. The lungs are clear. The pulmonary vascularity is normal. There is no evidence of a pleural effusion. There is no evidence of a pneumothorax or pneumoperitoneum.    X-ray hand January 2015  The osseous structures are well mineralized.  There is no acute fracture or dislocation.  The joint spaces are well maintained.  The overlying soft tissues are unremarkable.     X-ray lumbar spine October 2018  There is a remote T12 superior endplate compression fracture.  Degenerative disc disease seen at L5-S1.  Alignment is satisfactory.  No other fractures are seen.    MRI lumbar spine October 2018  1.  Degenerative disc disease and facet osteoarthritis of the lumbar spine with right paracentral disc protrusion at L4-L5 having mass effect on the traversing right L5 nerve within the lateral recess.   2.  Right renal cyst.  3. Tarlov cysts.     ASSESSMENT / PLAN:     Wilbert Luna is a 69 y.o. White male with:    1. Polymyalgia rheumatica  - over 50 years old and bilateral shoulder aching, abnormal CRP.    - Absence of other joint involvement.  Resolution of symptoms with systemic corticosteroids, refractory after stopping medication.  - will resume  steroids, and provide with longer taper.  If once again refractory, will provide with DMARD therapy  - currently without features suggestive of giant cell arteritis  - will send for densitometry test on next visit.  History of vertebral fractures after high impact trauma  - will likely recommend bone antiresorptive therapy with calcium and vitamin-D  - C-Reactive Protein; Standing  - Sedimentation rate; Standing  - Rheumatoid factor; Future  - Cyclic Citrullinated Peptide Antibody, IgG; Future  - Interleukin-6; Future  - Protein electrophoresis, serum; Standing  - Immunofixation Electrophoresis; Standing  - Quantiferon Gold TB; Future  - predniSONE (DELTASONE) 5 MG tablet; Take 4 tablets (20 mg total) by mouth once daily for 7 days, THEN 3 tablets (15 mg total) once daily for 7 days, THEN 2.5 tablets (12.5 mg total) once daily for 10 days, THEN 2 tablets (10 mg total) once daily for 10 days, THEN 1.5 tablets (7.5 mg total) once daily for 10 days, THEN 1 tablet (5 mg total) once daily for 7 days, THEN 1 tablet (5 mg total) every other day for 7 days.  Dispense: 119 tablet; Refill: 0    2. Other specified counseling  - over 10 minutes spent regarding below topics:  - Immunization counseling done.  - Limitation of alcohol consumption.  - Regular exercise:  Aerobic and resistance.  - Medication counseling provided.    No follow-ups on file.   RTC 2 weeks    Method of contact with patient concerns: [facility]    Disclaimer:  This note is prepared using voice recognition software and as such is likely to have errors and has not been proof read. Please contact me for questions.     Time spent: 60 minutes in face to face discussion concerning diagnosis, prognosis, review of lab and test results, benefits of treatment as well as management of disease, counseling of patient and coordination of care between various health care providers.  Greater than half the time spent was used for coordination of care and  counseling of patient.    Santos Casper M.D.  Rheumatology Department   [facility]

## 2020-08-25 NOTE — LETTER
August 25, 2020      Sergio Quick MD  [address] [address]  [place] [address]           [facility] Rheumat[facility]  [address] [place] [address]  Phone: [phone]  Fax: [phone]          Patient: Wlibert Luna   MR Number: [id number]   YOB: 1950   Date of Visit: 8/25/2020       Dear Dr. Sergio Quick:    Thank you for referring Wilbert Luna to me for evaluation. Attached you will find relevant portions of my assessment and plan of care.    If you have questions, please do not hesitate to call me. I look forward to following Wilbert Luna along with you.    Sincerely,    Santos Casper MD    Enclosure  CC:  No Recipients    If you would like to receive this communication electronically, please contact [email or web address] or [phone] to request more information on [employer] access.    For providers and/or their staff who would like to refer a patient to [facility], please contact us through our one-stop-shop provider referral line, [facility], at [phone].    If you feel you have received this communication in error or would no longer like to receive these types of communications, please e-mail [email or web address]

## 2020-08-26 ENCOUNTER — DOCUMENTATION ONLY (OUTPATIENT)
Dept: RHEUMATOLOGY | Facility: CLINIC | Age: 70
End: 2020-08-26

## 2020-08-26 LAB
CCP AB SER IA-ACNC: 1.5 U/ML
RHEUMATOID FACT SERPL-ACNC: 20 IU/ML (ref 0–15)

## 2020-08-27 LAB
ALBUMIN SERPL ELPH-MCNC: 3.64 G/DL (ref 3.35–5.55)
ALPHA1 GLOB SERPL ELPH-MCNC: 0.49 G/DL (ref 0.17–0.41)
ALPHA2 GLOB SERPL ELPH-MCNC: 0.99 G/DL (ref 0.43–0.99)
B-GLOBULIN SERPL ELPH-MCNC: 0.86 G/DL (ref 0.5–1.1)
GAMMA GLOB SERPL ELPH-MCNC: 0.92 G/DL (ref 0.67–1.58)
GAMMA INTERFERON BACKGROUND BLD IA-ACNC: 0.04 IU/ML
INTERPRETATION SERPL IFE-IMP: NORMAL
M TB IFN-G CD4+ BCKGRND COR BLD-ACNC: 0.01 IU/ML
MITOGEN IGNF BCKGRD COR BLD-ACNC: >10 IU/ML
PATHOLOGIST INTERPRETATION IFE: NORMAL
PATHOLOGIST INTERPRETATION SPE: NORMAL
PROT SERPL-MCNC: 6.9 G/DL (ref 6–8.4)
TB GOLD PLUS: NEGATIVE
TB2 - NIL: 0.01 IU/ML

## 2020-08-31 LAB — IL6 SERPL-MCNC: 11.5 PG/ML

## 2020-09-04 ENCOUNTER — CLINICAL SUPPORT (OUTPATIENT)
Dept: FAMILY MEDICINE | Facility: CLINIC | Age: 70
End: 2020-09-04
Payer: MEDICARE

## 2020-09-04 VITALS — HEART RATE: 56 BPM | DIASTOLIC BLOOD PRESSURE: 66 MMHG | SYSTOLIC BLOOD PRESSURE: 133 MMHG

## 2020-09-04 DIAGNOSIS — Z01.30 BP CHECK: Primary | ICD-10-CM

## 2020-09-04 PROCEDURE — 99999 PR PBB SHADOW E&M-EST. PATIENT-LVL I: CPT | Mod: PBBFAC,,,

## 2020-09-04 PROCEDURE — 99999 PR PBB SHADOW E&M-EST. PATIENT-LVL I: ICD-10-PCS | Mod: PBBFAC,,,

## 2020-09-08 ENCOUNTER — OFFICE VISIT (OUTPATIENT)
Dept: RHEUMATOLOGY | Facility: CLINIC | Age: 70
End: 2020-09-08
Payer: MEDICARE

## 2020-09-08 VITALS
HEIGHT: 71 IN | BODY MASS INDEX: 29.38 KG/M2 | HEART RATE: 54 BPM | SYSTOLIC BLOOD PRESSURE: 121 MMHG | DIASTOLIC BLOOD PRESSURE: 72 MMHG | WEIGHT: 209.88 LBS

## 2020-09-08 DIAGNOSIS — Z71.89 COUNSELING ON HEALTH PROMOTION AND DISEASE PREVENTION: ICD-10-CM

## 2020-09-08 DIAGNOSIS — M35.3 POLYMYALGIA RHEUMATICA SYNDROME: Primary | ICD-10-CM

## 2020-09-08 PROCEDURE — 1101F PT FALLS ASSESS-DOCD LE1/YR: CPT | Mod: S$GLB,,, | Performed by: INTERNAL MEDICINE

## 2020-09-08 PROCEDURE — 1126F AMNT PAIN NOTED NONE PRSNT: CPT | Mod: S$GLB,,, | Performed by: INTERNAL MEDICINE

## 2020-09-08 PROCEDURE — 3008F PR BODY MASS INDEX (BMI) DOCUMENTED: ICD-10-PCS | Mod: S$GLB,,, | Performed by: INTERNAL MEDICINE

## 2020-09-08 PROCEDURE — 1101F PR PT FALLS ASSESS DOC 0-1 FALLS W/OUT INJ PAST YR: ICD-10-PCS | Mod: S$GLB,,, | Performed by: INTERNAL MEDICINE

## 2020-09-08 PROCEDURE — 1159F PR MEDICATION LIST DOCUMENTED IN MEDICAL RECORD: ICD-10-PCS | Mod: S$GLB,,, | Performed by: INTERNAL MEDICINE

## 2020-09-08 PROCEDURE — 99999 PR PBB SHADOW E&M-EST. PATIENT-LVL III: ICD-10-PCS | Mod: PBBFAC,,, | Performed by: INTERNAL MEDICINE

## 2020-09-08 PROCEDURE — 99999 PR PBB SHADOW E&M-EST. PATIENT-LVL III: CPT | Mod: PBBFAC,,, | Performed by: INTERNAL MEDICINE

## 2020-09-08 PROCEDURE — 1159F MED LIST DOCD IN RCRD: CPT | Mod: S$GLB,,, | Performed by: INTERNAL MEDICINE

## 2020-09-08 PROCEDURE — 1126F PR PAIN SEVERITY QUANTIFIED, NO PAIN PRESENT: ICD-10-PCS | Mod: S$GLB,,, | Performed by: INTERNAL MEDICINE

## 2020-09-08 PROCEDURE — 99214 PR OFFICE/OUTPT VISIT, EST, LEVL IV, 30-39 MIN: ICD-10-PCS | Mod: S$GLB,,, | Performed by: INTERNAL MEDICINE

## 2020-09-08 PROCEDURE — 99214 OFFICE O/P EST MOD 30 MIN: CPT | Mod: S$GLB,,, | Performed by: INTERNAL MEDICINE

## 2020-09-08 PROCEDURE — 3008F BODY MASS INDEX DOCD: CPT | Mod: S$GLB,,, | Performed by: INTERNAL MEDICINE

## 2020-09-08 RX ORDER — HYDROGEN PEROXIDE 3 %
20 SOLUTION, NON-ORAL MISCELLANEOUS
Status: ON HOLD | COMMUNITY
End: 2022-10-21 | Stop reason: HOSPADM

## 2020-09-08 RX ORDER — MUPIROCIN 20 MG/G
OINTMENT TOPICAL
COMMUNITY
Start: 2020-09-02 | End: 2022-09-01

## 2020-09-08 RX ORDER — MULTIVITAMIN
1 TABLET ORAL 2 TIMES DAILY
Qty: 60 TABLET | Refills: 3 | Status: SHIPPED | OUTPATIENT
Start: 2020-09-08 | End: 2021-02-03

## 2020-09-08 NOTE — PROGRESS NOTES
RHEUMATOLOGY OUTPATIENT CLINIC NOTE    9/8/2020    Attending Rheumatologist: Santos Casper  Primary Care Provider: Sergio Quick MD   Physician Requesting Consultation: No referring provider defined for this encounter.  Chief Complaint/Reason For Consultation:  polymyalgia  (follow up )    Subjective:       HPI  Wilbert Luna is a 69 y.o. White male with polymyalgia rheumatica syndrome comes for follow-up.    Today  Last seen on late August, recommended rheumatic workup and longer prednisone taper.  Currently on 15 mg daily the complete resolution of arthralgias and stiffness.  Currently denies new onset visual symptoms, or claudication, or chest pain.    Review of Systems   Constitutional: Negative for chills, fever and malaise/fatigue.   HENT:        Denies jaw claudication.   Eyes: Negative for blurred vision, pain and redness.   Respiratory: Negative for cough, hemoptysis and shortness of breath.    Cardiovascular: Negative for chest pain and leg swelling.   Gastrointestinal: Negative for abdominal pain, blood in stool and melena.   Genitourinary: Negative for dysuria and hematuria.   Musculoskeletal: Negative for falls, joint pain and myalgias (Resolution of arthralgias and myalgias since on prednisone).   Skin: Negative for rash.   Neurological: Negative for tingling, focal weakness and headaches.   Psychiatric/Behavioral: Negative for memory loss. The patient does not have insomnia.      Chronic comorbid conditions affecting medical decision making today:  Past Medical History:   Diagnosis Date    Cancer skin cancer on head     Past Surgical History:   Procedure Laterality Date    COLONOSCOPY N/A 6/5/2019    Procedure: COLONOSCOPY;  Surgeon: Kaiden Gallagher III, MD;  Location: [facility];  Service: Endoscopy;  Laterality: N/A;    HERNIA REPAIR      SPINE SURGERY     · Cervical spinal fracture after trauma (branch fell on neck)  · LS surgery from DDD    Family History   Problem Relation Age of  "Onset    Hypertension Mother     Cancer Father     Hypertension Maternal Grandmother     Thyroid disease Maternal Grandmother     Amblyopia Neg Hx     Blindness Neg Hx     Cataracts Neg Hx     Diabetes Neg Hx     Glaucoma Neg Hx     Macular degeneration Neg Hx     Retinal detachment Neg Hx     Strabismus Neg Hx     Stroke Neg Hx      Social History     Substance and Sexual Activity   Alcohol Use No     Social History     Tobacco Use   Smoking Status Never Smoker   Smokeless Tobacco Never Used     Social History     Substance and Sexual Activity   Drug Use No       Current Outpatient Medications:     esomeprazole magnesium (NEXIUM) 10 mg suspension, Take 10 mg by mouth before breakfast., Disp: , Rfl:     fluticasone propionate (FLONASE) 50 mcg/actuation nasal spray, 2 sprays by Each Nostril route once daily., Disp: , Rfl:     meloxicam (MOBIC) 15 MG tablet, Take 1 tablet (15 mg total) by mouth once daily., Disp: 30 tablet, Rfl: 0    mupirocin (BACTROBAN) 2 % ointment, RAFA TO NOSE BID FOR 7 DAYS, Disp: , Rfl:     predniSONE (DELTASONE) 5 MG tablet, Take 4 tablets (20 mg total) by mouth once daily for 7 days, THEN 3 tablets (15 mg total) once daily for 7 days, THEN 2.5 tablets (12.5 mg total) once daily for 10 days, THEN 2 tablets (10 mg total) once daily for 10 days, THEN 1.5 tablets (7.5 mg total) once daily for 10 days, THEN 1 tablet (5 mg total) once daily for 7 days, THEN 1 tablet (5 mg total) every other day for 7 days., Disp: 119 tablet, Rfl: 0     Objective:         Vitals:    09/08/20 1435   BP: 121/72   Pulse: (!) 54     Physical Exam   Constitutional: No distress.   Estimated body mass index is 29.27 kg/m² as calculated from the following:    Height as of this encounter: 5' 11" (1.803 m).    Weight as of this encounter: 95.2 kg (209 lb 14.1 oz).    Wt Readings from Last 1 Encounters:  09/08/20 1435 : 95.2 kg (209 lb 14.1 oz)     HENT:   Head: Normocephalic and atraumatic.   Eyes: " Conjunctivae are normal. Pupils are equal, round, and reactive to light.   Neck: Normal range of motion.   Cardiovascular: Normal rate and intact distal pulses.    Pulmonary/Chest: Effort normal. No respiratory distress.   Abdominal: Soft. He exhibits no distension.   Neurological: He is alert. Gait normal.   Skin: No rash noted. No erythema.     Musculoskeletal: Normal range of motion. No tenderness.      Comments: : strong  No synovitis or significant squeeze tenderness    AROM: intact  PROM: intact    Devices used by patient: none       Reviewed old and all outside pertinent medical records available.    All lab results personally reviewed and interpreted by me.  Lab Results   Component Value Date    WBC 10.50 08/03/2020    HGB 13.7 (L) 08/03/2020    HCT 43.3 08/03/2020    MCV 90 08/03/2020    MCH 28.6 08/03/2020    MCHC 31.6 (L) 08/03/2020    RDW 12.4 08/03/2020     08/03/2020    MPV 10.2 08/03/2020       Lab Results   Component Value Date     08/03/2020    K 4.8 08/03/2020     08/03/2020    CO2 30 (H) 08/03/2020    GLU 85 08/03/2020    BUN 17 08/03/2020    CALCIUM 9.3 08/03/2020    PROT 7.3 08/03/2020    ALBUMIN 3.7 08/03/2020    BILITOT 0.3 08/03/2020    AST 23 08/03/2020    ALKPHOS 94 08/03/2020    ALT 21 08/03/2020       Lab Results   Component Value Date    COLORU Yellow 09/17/2016    APPEARANCEUA Clear 09/17/2016    SPECGRAV 1.020 09/17/2016    PHUR 6.0 09/17/2016    PROTEINUA 2+ (A) 09/17/2016    KETONESU Trace (A) 09/17/2016    LEUKOCYTESUR Negative 09/17/2016    NITRITE Negative 09/17/2016    UROBILINOGEN 2.0-3.0 (A) 09/17/2016       Lab Results   Component Value Date    CRP 64.1 (H) 08/25/2020       Lab Results   Component Value Date    SEDRATE 16 08/25/2020       Lab Results   Component Value Date    RF 20.0 (H) 08/25/2020    SEDRATE 16 08/25/2020       No components found for: 25OHVITDTOT, 74LNKKWT5, 91ANWXPT4, METHODNOTE    No results found for: URICACID    No components found  for: TSPOTTB    · SPE/SHAYY 8/2020: Normal total protein. No monoclonal peaks identified.  · IL6 11.5 (ref <1.8)    Rheum Labs:    CCP negative.  Rheumatoid factor 20 (ref <15)    Infectious Labs:   HCV NR 4/2019    QuantiFERON TB negative August 2020    Imaging:  All imaging reviewed and independently  interpreted by me.    MRI T-spine June 2014  Acute uperior endplate fracture of the T12 vertebral body without significant associated retropulsion of fracture fragments, central canal stenosis or abnormal cord signal.    X-ray C-spine July 2014   C7 spinous process fracture with additional T1 spinous process fracture     Chest x-ray September 2014  The heart is within normal limits in size. The lungs are clear. The pulmonary vascularity is normal. There is no evidence of a pleural effusion. There is no evidence of a pneumothorax or pneumoperitoneum.    X-ray hand January 2015  The osseous structures are well mineralized.  There is no acute fracture or dislocation.  The joint spaces are well maintained.  The overlying soft tissues are unremarkable.     X-ray lumbar spine October 2018  There is a remote T12 superior endplate compression fracture.  Degenerative disc disease seen at L5-S1.  Alignment is satisfactory.  No other fractures are seen.    MRI lumbar spine October 2018  1.  Degenerative disc disease and facet osteoarthritis of the lumbar spine with right paracentral disc protrusion at L4-L5 having mass effect on the traversing right L5 nerve within the lateral recess.   2.  Right renal cyst.  3. Tarlov cysts.     ASSESSMENT / PLAN:     Wilbert Luna is a 69 y.o. White male with:    1. Polymyalgia rheumatica syndrome  - resolution of symptoms since on prednisone, currently on 50 mg daily.  - rheumatoid factor slightly above reference value, elevation of acute phase reactants  - if refractory symptoms, instructed to communicate with clinic and will provide with longer taper and initiate DMARD therapy with  methotrexate.  - considering updating x-ray films and sending for x-ray feet to assess for erosive changes.  - clinical significant side effects of therapy discussed in detail.  - continue with adequate calcium and vitamin-D dietary intake  - will send for densitometry test on next visit.  History of vertebral fractures after high impact trauma  - will likely recommend bone antiresorptive therapy with calcium and vitamin-D    2. Other specified counseling  - Immunization counseling done.  - Limitation of alcohol consumption.  - Regular exercise:  Aerobic and resistance.  - Medication counseling provided.    Follow up in about 3 months (around 12/8/2020).     Method of contact with patient concerns: [facility]    Disclaimer:  This note is prepared using voice recognition software and as such is likely to have errors and has not been proof read. Please contact me for questions.     Time spent: 25 minutes in face to face discussion concerning diagnosis, prognosis, review of lab and test results, benefits of treatment as well as management of disease, counseling of patient and coordination of care between various health care providers.  Greater than half the time spent was used for coordination of care and counseling of patient.    Santos Casper M.D.  Rheumatology Department   [facility]

## 2020-12-09 ENCOUNTER — LAB VISIT (OUTPATIENT)
Dept: LAB | Facility: HOSPITAL | Age: 70
End: 2020-12-09
Attending: FAMILY MEDICINE
Payer: MEDICARE

## 2020-12-09 ENCOUNTER — PATIENT OUTREACH (OUTPATIENT)
Dept: ADMINISTRATIVE | Facility: OTHER | Age: 70
End: 2020-12-09

## 2020-12-09 ENCOUNTER — OFFICE VISIT (OUTPATIENT)
Dept: RHEUMATOLOGY | Facility: CLINIC | Age: 70
End: 2020-12-09
Payer: MEDICARE

## 2020-12-09 VITALS
SYSTOLIC BLOOD PRESSURE: 135 MMHG | WEIGHT: 214.06 LBS | DIASTOLIC BLOOD PRESSURE: 78 MMHG | HEART RATE: 59 BPM | HEIGHT: 71 IN | BODY MASS INDEX: 29.97 KG/M2

## 2020-12-09 DIAGNOSIS — Z79.899 HIGH RISK MEDICATION USE: ICD-10-CM

## 2020-12-09 DIAGNOSIS — M35.3 POLYMYALGIA RHEUMATICA SYNDROME: ICD-10-CM

## 2020-12-09 DIAGNOSIS — M81.0 AGE-RELATED OSTEOPOROSIS WITHOUT CURRENT PATHOLOGICAL FRACTURE: ICD-10-CM

## 2020-12-09 DIAGNOSIS — M35.3 POLYMYALGIA RHEUMATICA SYNDROME: Primary | ICD-10-CM

## 2020-12-09 DIAGNOSIS — Z71.89 COUNSELING ON HEALTH PROMOTION AND DISEASE PREVENTION: ICD-10-CM

## 2020-12-09 DIAGNOSIS — M15.9 PRIMARY OSTEOARTHRITIS INVOLVING MULTIPLE JOINTS: ICD-10-CM

## 2020-12-09 LAB
ALBUMIN SERPL BCP-MCNC: 4.1 G/DL (ref 3.5–5.2)
ALP SERPL-CCNC: 81 U/L (ref 55–135)
ALT SERPL W/O P-5'-P-CCNC: 19 U/L (ref 10–44)
ANION GAP SERPL CALC-SCNC: 10 MMOL/L (ref 8–16)
AST SERPL-CCNC: 23 U/L (ref 10–40)
BASOPHILS # BLD AUTO: 0.05 K/UL (ref 0–0.2)
BASOPHILS NFR BLD: 0.5 % (ref 0–1.9)
BILIRUB SERPL-MCNC: 0.4 MG/DL (ref 0.1–1)
BUN SERPL-MCNC: 18 MG/DL (ref 8–23)
CALCIUM SERPL-MCNC: 9.4 MG/DL (ref 8.7–10.5)
CHLORIDE SERPL-SCNC: 102 MMOL/L (ref 95–110)
CO2 SERPL-SCNC: 30 MMOL/L (ref 23–29)
CREAT SERPL-MCNC: 1.3 MG/DL (ref 0.5–1.4)
CRP SERPL-MCNC: 1.7 MG/L (ref 0–8.2)
DIFFERENTIAL METHOD: NORMAL
EOSINOPHIL # BLD AUTO: 0.2 K/UL (ref 0–0.5)
EOSINOPHIL NFR BLD: 2.5 % (ref 0–8)
ERYTHROCYTE [DISTWIDTH] IN BLOOD BY AUTOMATED COUNT: 13.3 % (ref 11.5–14.5)
ERYTHROCYTE [SEDIMENTATION RATE] IN BLOOD BY WESTERGREN METHOD: 1 MM/HR (ref 0–10)
EST. GFR  (AFRICAN AMERICAN): >60 ML/MIN/1.73 M^2
EST. GFR  (NON AFRICAN AMERICAN): 56 ML/MIN/1.73 M^2
GLUCOSE SERPL-MCNC: 63 MG/DL (ref 70–110)
HCT VFR BLD AUTO: 47.4 % (ref 40–54)
HGB BLD-MCNC: 15.3 G/DL (ref 14–18)
IMM GRANULOCYTES # BLD AUTO: 0.04 K/UL (ref 0–0.04)
IMM GRANULOCYTES NFR BLD AUTO: 0.4 % (ref 0–0.5)
LYMPHOCYTES # BLD AUTO: 2.3 K/UL (ref 1–4.8)
LYMPHOCYTES NFR BLD: 24.5 % (ref 18–48)
MCH RBC QN AUTO: 28.3 PG (ref 27–31)
MCHC RBC AUTO-ENTMCNC: 32.3 G/DL (ref 32–36)
MCV RBC AUTO: 88 FL (ref 82–98)
MONOCYTES # BLD AUTO: 0.6 K/UL (ref 0.3–1)
MONOCYTES NFR BLD: 6.5 % (ref 4–15)
NEUTROPHILS # BLD AUTO: 6.3 K/UL (ref 1.8–7.7)
NEUTROPHILS NFR BLD: 65.6 % (ref 38–73)
NRBC BLD-RTO: 0 /100 WBC
PLATELET # BLD AUTO: 257 K/UL (ref 150–350)
PMV BLD AUTO: 9.6 FL (ref 9.2–12.9)
POTASSIUM SERPL-SCNC: 4.1 MMOL/L (ref 3.5–5.1)
PROT SERPL-MCNC: 7.4 G/DL (ref 6–8.4)
RBC # BLD AUTO: 5.4 M/UL (ref 4.6–6.2)
SODIUM SERPL-SCNC: 142 MMOL/L (ref 136–145)
WBC # BLD AUTO: 9.55 K/UL (ref 3.9–12.7)

## 2020-12-09 PROCEDURE — 99999 PR PBB SHADOW E&M-EST. PATIENT-LVL III: CPT | Mod: PBBFAC,,, | Performed by: INTERNAL MEDICINE

## 2020-12-09 PROCEDURE — 1157F PR ADVANCE CARE PLAN OR EQUIV PRESENT IN MEDICAL RECORD: ICD-10-PCS | Mod: S$GLB,,, | Performed by: INTERNAL MEDICINE

## 2020-12-09 PROCEDURE — 1101F PT FALLS ASSESS-DOCD LE1/YR: CPT | Mod: S$GLB,,, | Performed by: INTERNAL MEDICINE

## 2020-12-09 PROCEDURE — 1126F AMNT PAIN NOTED NONE PRSNT: CPT | Mod: S$GLB,,, | Performed by: INTERNAL MEDICINE

## 2020-12-09 PROCEDURE — 1101F PR PT FALLS ASSESS DOC 0-1 FALLS W/OUT INJ PAST YR: ICD-10-PCS | Mod: S$GLB,,, | Performed by: INTERNAL MEDICINE

## 2020-12-09 PROCEDURE — 99214 PR OFFICE/OUTPT VISIT, EST, LEVL IV, 30-39 MIN: ICD-10-PCS | Mod: S$GLB,,, | Performed by: INTERNAL MEDICINE

## 2020-12-09 PROCEDURE — 1159F PR MEDICATION LIST DOCUMENTED IN MEDICAL RECORD: ICD-10-PCS | Mod: S$GLB,,, | Performed by: INTERNAL MEDICINE

## 2020-12-09 PROCEDURE — 80053 COMPREHEN METABOLIC PANEL: CPT

## 2020-12-09 PROCEDURE — 1126F PR PAIN SEVERITY QUANTIFIED, NO PAIN PRESENT: ICD-10-PCS | Mod: S$GLB,,, | Performed by: INTERNAL MEDICINE

## 2020-12-09 PROCEDURE — 85025 COMPLETE CBC W/AUTO DIFF WBC: CPT

## 2020-12-09 PROCEDURE — 99999 PR PBB SHADOW E&M-EST. PATIENT-LVL III: ICD-10-PCS | Mod: PBBFAC,,, | Performed by: INTERNAL MEDICINE

## 2020-12-09 PROCEDURE — 1159F MED LIST DOCD IN RCRD: CPT | Mod: S$GLB,,, | Performed by: INTERNAL MEDICINE

## 2020-12-09 PROCEDURE — 1157F ADVNC CARE PLAN IN RCRD: CPT | Mod: S$GLB,,, | Performed by: INTERNAL MEDICINE

## 2020-12-09 PROCEDURE — 85651 RBC SED RATE NONAUTOMATED: CPT

## 2020-12-09 PROCEDURE — 99214 OFFICE O/P EST MOD 30 MIN: CPT | Mod: S$GLB,,, | Performed by: INTERNAL MEDICINE

## 2020-12-09 PROCEDURE — 3288F FALL RISK ASSESSMENT DOCD: CPT | Mod: S$GLB,,, | Performed by: INTERNAL MEDICINE

## 2020-12-09 PROCEDURE — 3008F BODY MASS INDEX DOCD: CPT | Mod: S$GLB,,, | Performed by: INTERNAL MEDICINE

## 2020-12-09 PROCEDURE — 3288F PR FALLS RISK ASSESSMENT DOCUMENTED: ICD-10-PCS | Mod: S$GLB,,, | Performed by: INTERNAL MEDICINE

## 2020-12-09 PROCEDURE — 86140 C-REACTIVE PROTEIN: CPT

## 2020-12-09 PROCEDURE — 36415 COLL VENOUS BLD VENIPUNCTURE: CPT

## 2020-12-09 PROCEDURE — 3008F PR BODY MASS INDEX (BMI) DOCUMENTED: ICD-10-PCS | Mod: S$GLB,,, | Performed by: INTERNAL MEDICINE

## 2020-12-09 RX ORDER — FOLIC ACID 1 MG/1
1 TABLET ORAL DAILY
Qty: 30 TABLET | Refills: 4 | Status: SHIPPED | OUTPATIENT
Start: 2020-12-09 | End: 2021-02-03 | Stop reason: SDUPTHER

## 2020-12-09 RX ORDER — PREDNISONE 5 MG/1
TABLET ORAL
Qty: 50 TABLET | Refills: 0 | Status: SHIPPED | OUTPATIENT
Start: 2020-12-09 | End: 2021-02-03 | Stop reason: SDUPTHER

## 2020-12-09 RX ORDER — METHOTREXATE 2.5 MG/1
10 TABLET ORAL
Qty: 20 TABLET | Refills: 3 | Status: SHIPPED | OUTPATIENT
Start: 2020-12-09 | End: 2021-02-03 | Stop reason: SDUPTHER

## 2020-12-09 NOTE — PROGRESS NOTES
RHEUMATOLOGY OUTPATIENT CLINIC NOTE    12/9/2020    Attending Rheumatologist: Santos Casper  Primary Care Provider: Sergio Quick MD   Physician Requesting Consultation: No referring provider defined for this encounter.  Chief Complaint/Reason For Consultation:  prm    Subjective:       HPI  Wilbert Luna is a 69 y.o. White male with polymyalgia rheumatica syndrome comes for follow-up.    Today  Last seen on September.  Provided with longer prednisone taper.  Patient refers refractory proximal arthralgias and stiffness when decreasing prednisone below 5 mg daily.  Currently on 4 mg of prednisone refers being asymptomatic.  Denies fever, jaw claudication, new onset headaches or blurry vision.    Review of Systems   Constitutional: Negative for chills, fever and malaise/fatigue.   Eyes: Negative for pain and redness.   Respiratory: Negative for cough, hemoptysis and shortness of breath.    Cardiovascular: Negative for chest pain and leg swelling.   Gastrointestinal: Negative for abdominal pain, blood in stool and melena.   Genitourinary: Negative for dysuria and hematuria.   Musculoskeletal: Negative for falls and joint pain (Refractory proximal arthralgias when below 5 mg of prednisone).   Skin: Negative for rash.   Neurological: Negative for tingling, focal weakness and weakness.   Psychiatric/Behavioral: Negative for memory loss. The patient does not have insomnia.      Chronic comorbid conditions affecting medical decision making today:  Past Medical History:   Diagnosis Date    Cancer skin cancer on head     Past Surgical History:   Procedure Laterality Date    COLONOSCOPY N/A 6/5/2019    Procedure: COLONOSCOPY;  Surgeon: Kaiden Gallagher III, MD;  Location: [facility];  Service: Endoscopy;  Laterality: N/A;    HERNIA REPAIR      SPINE SURGERY     · Cervical spinal fracture after trauma (branch fell on neck)  · LS surgery from DDD    Family History   Problem Relation Age of Onset    Hypertension  "Mother     Cancer Father     Hypertension Maternal Grandmother     Thyroid disease Maternal Grandmother     Amblyopia Neg Hx     Blindness Neg Hx     Cataracts Neg Hx     Diabetes Neg Hx     Glaucoma Neg Hx     Macular degeneration Neg Hx     Retinal detachment Neg Hx     Strabismus Neg Hx     Stroke Neg Hx      Social History     Substance and Sexual Activity   Alcohol Use No     Social History     Tobacco Use   Smoking Status Never Smoker   Smokeless Tobacco Never Used     Social History     Substance and Sexual Activity   Drug Use No       Current Outpatient Medications:     calcium-vitamin D (CALCIUM 600 + D,3,) 600 mg(1,500mg) -400 unit Tab, Take 1 tablet by mouth 2 (two) times daily., Disp: 60 tablet, Rfl: 3    esomeprazole magnesium (NEXIUM) 10 mg suspension, Take 10 mg by mouth before breakfast., Disp: , Rfl:     fluticasone propionate (FLONASE) 50 mcg/actuation nasal spray, 2 sprays by Each Nostril route once daily., Disp: , Rfl:     mupirocin (BACTROBAN) 2 % ointment, RAFA TO NOSE BID FOR 7 DAYS, Disp: , Rfl:     folic acid (FOLVITE) 1 MG tablet, Take 1 tablet (1 mg total) by mouth once daily., Disp: 30 tablet, Rfl: 4    meloxicam (MOBIC) 15 MG tablet, Take 1 tablet (15 mg total) by mouth once daily., Disp: 30 tablet, Rfl: 0    methotrexate 2.5 MG Tab, Take 4 tablets (10 mg total) by mouth every 7 days., Disp: 20 tablet, Rfl: 3    predniSONE (DELTASONE) 5 MG tablet, Take 1 tablet (5 mg total) by mouth once daily for 20 days, THEN 0.5 tablets (2.5 mg total) once daily., Disp: 50 tablet, Rfl: 0     Objective:         Vitals:    12/09/20 1244   BP: 135/78   Pulse: (!) 59     Physical Exam   Constitutional: No distress.   Estimated body mass index is 29.86 kg/m² as calculated from the following:    Height as of this encounter: 5' 11" (1.803 m).    Weight as of this encounter: 97.1 kg (214 lb 1.1 oz).    Wt Readings from Last 1 Encounters:  12/09/20 1244 : 97.1 kg (214 lb 1.1 oz)     HENT: "   Head: Normocephalic and atraumatic.   Eyes: Conjunctivae are normal. Pupils are equal, round, and reactive to light.   Neck: Normal range of motion.   Cardiovascular: Normal rate and intact distal pulses.    Pulmonary/Chest: Effort normal. No respiratory distress.   Abdominal: Soft. He exhibits no distension.   Neurological: He is alert. Gait normal.   Skin: No rash noted. No erythema.     Musculoskeletal: Normal range of motion. Tenderness (slight tenderness to palpation cystic structure on rt. second PIP ) present.      Comments: : strong  No synovitis or significant squeeze tenderness    AROM: intact  PROM: intact    Devices used by patient: none       Reviewed old and all outside pertinent medical records available.    All lab results personally reviewed and interpreted by me.  Lab Results   Component Value Date    WBC 10.50 08/03/2020    HGB 13.7 (L) 08/03/2020    HCT 43.3 08/03/2020    MCV 90 08/03/2020    MCH 28.6 08/03/2020    MCHC 31.6 (L) 08/03/2020    RDW 12.4 08/03/2020     08/03/2020    MPV 10.2 08/03/2020       Lab Results   Component Value Date     08/03/2020    K 4.8 08/03/2020     08/03/2020    CO2 30 (H) 08/03/2020    GLU 85 08/03/2020    BUN 17 08/03/2020    CALCIUM 9.3 08/03/2020    PROT 7.3 08/03/2020    ALBUMIN 3.7 08/03/2020    BILITOT 0.3 08/03/2020    AST 23 08/03/2020    ALKPHOS 94 08/03/2020    ALT 21 08/03/2020       Lab Results   Component Value Date    COLORU Yellow 09/17/2016    APPEARANCEUA Clear 09/17/2016    SPECGRAV 1.020 09/17/2016    PHUR 6.0 09/17/2016    PROTEINUA 2+ (A) 09/17/2016    KETONESU Trace (A) 09/17/2016    LEUKOCYTESUR Negative 09/17/2016    NITRITE Negative 09/17/2016    UROBILINOGEN 2.0-3.0 (A) 09/17/2016       Lab Results   Component Value Date    CRP 64.1 (H) 08/25/2020       Lab Results   Component Value Date    SEDRATE 16 08/25/2020       Lab Results   Component Value Date    RF 20.0 (H) 08/25/2020    SEDRATE 16 08/25/2020       No  components found for: 25OHVITDTOT, 17BOATRT8, 32YVGUMX9, METHODNOTE    No results found for: URICACID    No components found for: TSPOTTB    · SPE/SHAYY 8/2020: Normal total protein. No monoclonal peaks identified.  · IL6 11.5 (ref <1.8)    Rheum Labs:    CCP negative.  Rheumatoid factor 20 (ref <15)    Infectious Labs:   HCV NR 4/2019    QuantiFERON TB negative August 2020    Imaging:  All imaging reviewed and independently  interpreted by me.    X-ray hand January 2015  The osseous structures are well mineralized.  There is no acute fracture or dislocation.  The joint spaces are well maintained.  The overlying soft tissues are unremarkable.     X-ray lumbar spine October 2018  There is a remote T12 superior endplate compression fracture.  Degenerative disc disease seen at L5-S1.  Alignment is satisfactory.  No other fractures are seen.    MRI lumbar spine October 2018  1.  Degenerative disc disease and facet osteoarthritis of the lumbar spine with right paracentral disc protrusion at L4-L5 having mass effect on the traversing right L5 nerve within the lateral recess.   2.  Right renal cyst.  3. Tarlov cysts.     ASSESSMENT / PLAN:     Wilbert Luna is a 69 y.o. White male with:    1. Polymyalgia rheumatica syndrome  - refractory symptoms since on low-dose prednisone, currently on 4 mg prednisone daily and asymptomatic.  - differential diagnosis, seropositive RA.  - will provide with methotrexate as steroid sparing agent.  Clinical significant side effects of therapy discussed in detail.  - continue 5 mg of prednisone daily for 20 days, then continue 2.5 mg until next visit.  If needed, may resume 5mg daily  - continue daily calcium and vitamin-D.  Will send for densitometry test.  - labs to monitor for toxicity prior next visit.  - considering updating hand x-ray films and sending for x-ray feet to assess for erosive changes.  - clinical significant side effects of therapy discussed in detail.    2. Other  specified counseling  - Immunization counseling done.  - Limitation of alcohol consumption.  - Regular exercise:  Aerobic and resistance.  - Medication counseling provided.    Follow up in about 5 weeks (around 1/13/2021).     Method of contact with patient concerns: [facility]    Disclaimer:  This note is prepared using voice recognition software and as such is likely to have errors and has not been proof read. Please contact me for questions.     Santos Casper M.D.  Rheumatology Department   [facility]

## 2020-12-09 NOTE — PROGRESS NOTES
Health Maintenance Due   Topic Date Due    Shingles Vaccine (1 of 2) 12/31/2002    Pneumococcal Vaccine (65+ Low/Medium Risk) (2 of 2 - PPSV23) 04/29/2020    Influenza Vaccine (1) 08/01/2020     Updates were requested from care everywhere.  Chart was reviewed for overdue Proactive [facility] Encounters ([facility]) topics (CRS, Breast Cancer Screening, Eye exam)  Health Maintenance has been updated.  LINKS immunization registry triggered.  LINKS not responding.

## 2020-12-17 ENCOUNTER — TELEPHONE (OUTPATIENT)
Dept: RHEUMATOLOGY | Facility: CLINIC | Age: 70
End: 2020-12-17

## 2020-12-18 ENCOUNTER — TELEPHONE (OUTPATIENT)
Dept: RHEUMATOLOGY | Facility: CLINIC | Age: 70
End: 2020-12-18

## 2021-02-02 ENCOUNTER — PATIENT OUTREACH (OUTPATIENT)
Dept: ADMINISTRATIVE | Facility: OTHER | Age: 71
End: 2021-02-02

## 2021-02-03 ENCOUNTER — APPOINTMENT (OUTPATIENT)
Dept: RADIOLOGY | Facility: HOSPITAL | Age: 71
End: 2021-02-03
Attending: INTERNAL MEDICINE
Payer: MEDICARE

## 2021-02-03 ENCOUNTER — OFFICE VISIT (OUTPATIENT)
Dept: RHEUMATOLOGY | Facility: CLINIC | Age: 71
End: 2021-02-03
Attending: INTERNAL MEDICINE
Payer: MEDICARE

## 2021-02-03 VITALS
HEIGHT: 71 IN | DIASTOLIC BLOOD PRESSURE: 80 MMHG | BODY MASS INDEX: 30.21 KG/M2 | HEART RATE: 55 BPM | SYSTOLIC BLOOD PRESSURE: 149 MMHG | WEIGHT: 215.81 LBS

## 2021-02-03 DIAGNOSIS — Z71.89 COUNSELING ON HEALTH PROMOTION AND DISEASE PREVENTION: ICD-10-CM

## 2021-02-03 DIAGNOSIS — Z79.899 HIGH RISK MEDICATION USE: ICD-10-CM

## 2021-02-03 DIAGNOSIS — M81.0 AGE-RELATED OSTEOPOROSIS WITHOUT CURRENT PATHOLOGICAL FRACTURE: ICD-10-CM

## 2021-02-03 DIAGNOSIS — M35.3 POLYMYALGIA RHEUMATICA SYNDROME: Primary | ICD-10-CM

## 2021-02-03 DIAGNOSIS — M15.9 PRIMARY OSTEOARTHRITIS INVOLVING MULTIPLE JOINTS: ICD-10-CM

## 2021-02-03 PROCEDURE — 1126F PR PAIN SEVERITY QUANTIFIED, NO PAIN PRESENT: ICD-10-PCS | Mod: S$GLB,,, | Performed by: INTERNAL MEDICINE

## 2021-02-03 PROCEDURE — 3008F PR BODY MASS INDEX (BMI) DOCUMENTED: ICD-10-PCS | Mod: S$GLB,,, | Performed by: INTERNAL MEDICINE

## 2021-02-03 PROCEDURE — 1157F PR ADVANCE CARE PLAN OR EQUIV PRESENT IN MEDICAL RECORD: ICD-10-PCS | Mod: S$GLB,,, | Performed by: INTERNAL MEDICINE

## 2021-02-03 PROCEDURE — 77080 DXA BONE DENSITY AXIAL: CPT | Mod: TC

## 2021-02-03 PROCEDURE — 1101F PR PT FALLS ASSESS DOC 0-1 FALLS W/OUT INJ PAST YR: ICD-10-PCS | Mod: S$GLB,,, | Performed by: INTERNAL MEDICINE

## 2021-02-03 PROCEDURE — 1157F ADVNC CARE PLAN IN RCRD: CPT | Mod: S$GLB,,, | Performed by: INTERNAL MEDICINE

## 2021-02-03 PROCEDURE — 77080 DXA BONE DENSITY AXIAL: CPT | Mod: 26,,, | Performed by: RADIOLOGY

## 2021-02-03 PROCEDURE — 99999 PR PBB SHADOW E&M-EST. PATIENT-LVL III: ICD-10-PCS | Mod: PBBFAC,,, | Performed by: INTERNAL MEDICINE

## 2021-02-03 PROCEDURE — 3008F BODY MASS INDEX DOCD: CPT | Mod: S$GLB,,, | Performed by: INTERNAL MEDICINE

## 2021-02-03 PROCEDURE — 99999 PR PBB SHADOW E&M-EST. PATIENT-LVL III: CPT | Mod: PBBFAC,,, | Performed by: INTERNAL MEDICINE

## 2021-02-03 PROCEDURE — 1101F PT FALLS ASSESS-DOCD LE1/YR: CPT | Mod: S$GLB,,, | Performed by: INTERNAL MEDICINE

## 2021-02-03 PROCEDURE — 3288F PR FALLS RISK ASSESSMENT DOCUMENTED: ICD-10-PCS | Mod: S$GLB,,, | Performed by: INTERNAL MEDICINE

## 2021-02-03 PROCEDURE — 1126F AMNT PAIN NOTED NONE PRSNT: CPT | Mod: S$GLB,,, | Performed by: INTERNAL MEDICINE

## 2021-02-03 PROCEDURE — 1159F MED LIST DOCD IN RCRD: CPT | Mod: S$GLB,,, | Performed by: INTERNAL MEDICINE

## 2021-02-03 PROCEDURE — 99214 PR OFFICE/OUTPT VISIT, EST, LEVL IV, 30-39 MIN: ICD-10-PCS | Mod: S$GLB,,, | Performed by: INTERNAL MEDICINE

## 2021-02-03 PROCEDURE — 1159F PR MEDICATION LIST DOCUMENTED IN MEDICAL RECORD: ICD-10-PCS | Mod: S$GLB,,, | Performed by: INTERNAL MEDICINE

## 2021-02-03 PROCEDURE — 77080 DEXA BONE DENSITY SPINE HIP: ICD-10-PCS | Mod: 26,,, | Performed by: RADIOLOGY

## 2021-02-03 PROCEDURE — 99214 OFFICE O/P EST MOD 30 MIN: CPT | Mod: S$GLB,,, | Performed by: INTERNAL MEDICINE

## 2021-02-03 PROCEDURE — 3288F FALL RISK ASSESSMENT DOCD: CPT | Mod: S$GLB,,, | Performed by: INTERNAL MEDICINE

## 2021-02-03 RX ORDER — METHOTREXATE 2.5 MG/1
10 TABLET ORAL
Qty: 20 TABLET | Refills: 3 | Status: SHIPPED | OUTPATIENT
Start: 2021-02-03 | End: 2021-04-27

## 2021-02-03 RX ORDER — CYCLOBENZAPRINE HCL 10 MG
10 TABLET ORAL NIGHTLY
Qty: 30 TABLET | Refills: 0 | Status: SHIPPED | OUTPATIENT
Start: 2021-02-03 | End: 2021-02-13

## 2021-02-03 RX ORDER — PREDNISONE 5 MG/1
TABLET ORAL
Qty: 50 TABLET | Refills: 0 | Status: SHIPPED | OUTPATIENT
Start: 2021-02-03 | End: 2021-04-24

## 2021-02-03 RX ORDER — FOLIC ACID 1 MG/1
1 TABLET ORAL DAILY
Qty: 30 TABLET | Refills: 4 | Status: SHIPPED | OUTPATIENT
Start: 2021-02-03 | End: 2021-07-02

## 2021-02-04 ENCOUNTER — TELEPHONE (OUTPATIENT)
Dept: RHEUMATOLOGY | Facility: CLINIC | Age: 71
End: 2021-02-04

## 2021-02-05 ENCOUNTER — TELEPHONE (OUTPATIENT)
Dept: RHEUMATOLOGY | Facility: CLINIC | Age: 71
End: 2021-02-05

## 2021-04-05 NOTE — PROGRESS NOTES
"Subjective:      Patient ID: Wilbert Luna is a 68 y.o. male.    Chief Complaint: Foot Pain    HPI   Patient to clinic with complaint of possible foreign body to left great toe onset about a month ago.  He reports he was walking [place]footed [place] when he felt something poked the bottom of his toe and he had to pull it out.  He states he is unable to recall what it was that was in his toe.  Over the last month his toe has been swelling with some pain and he and his wife have been squeezing it causing some purulent drainage to be expressed from his toe.  Reports he has been soaking it and this has helped some.  He reports it will be nonpainful for a couple of days and then flared again and have drainage. He cannot identify any other exacerbating or mitigating factors.    Review of Systems   Constitutional: Negative for chills, fatigue and fever.   Respiratory: Negative for cough, shortness of breath and wheezing.    Cardiovascular: Negative for chest pain, palpitations and leg swelling.   Musculoskeletal:        See HPI   Skin: Negative for rash and wound.   Neurological: Negative for weakness and numbness.   Psychiatric/Behavioral: The patient is not nervous/anxious.        Objective:     /85   Pulse (!) 59   Temp 98.4 °F (36.9 °C) (Oral)   Ht 5' 11" (1.803 m)   Wt 96.6 kg (213 lb)   BMI 29.71 kg/m²     Physical Exam   Constitutional: He is oriented to person, place, and time. He appears well-developed and well-nourished.   HENT:   Head: Normocephalic and atraumatic.   Eyes: EOM are normal. Pupils are equal, round, and reactive to light.   Neck: Normal range of motion.   Cardiovascular: Normal rate, regular rhythm and normal heart sounds.   Pulmonary/Chest: Effort normal and breath sounds normal. No stridor. No respiratory distress. He has no wheezes. He has no rales.   Musculoskeletal:        Feet:    Neurological: He is alert and oriented to person, place, and time.   Skin: Skin is warm and " dry.   Psychiatric: He has a normal mood and affect. His behavior is normal. Judgment and thought content normal.     Assessment:     1. Superficial foreign body of left great toe, initial encounter        Plan:     Problem List Items Addressed This Visit     None      Visit Diagnoses     Superficial foreign body of left great toe, initial encounter    -  Primary    Relevant Medications    mupirocin (BACTROBAN) 2 % ointment      Continue soaking toe for the next week.  Bactroban ointment twice daily.  Patient does not wish to have an x-ray of his toe at this time and wishes to have this done in the future if his symptoms continue.  Report to ER if symptoms worsen    Follow-up if symptoms worsen or fail to improve.        Parts of this note was dictated using voice recognition software. Please excuse any grammatical or typographical errors.   vomiting

## 2021-05-10 ENCOUNTER — PATIENT MESSAGE (OUTPATIENT)
Dept: RESEARCH | Facility: HOSPITAL | Age: 71
End: 2021-05-10

## 2021-06-02 ENCOUNTER — TELEPHONE (OUTPATIENT)
Dept: RHEUMATOLOGY | Facility: CLINIC | Age: 71
End: 2021-06-02

## 2021-06-03 ENCOUNTER — OFFICE VISIT (OUTPATIENT)
Dept: RHEUMATOLOGY | Facility: CLINIC | Age: 71
End: 2021-06-03
Payer: MEDICARE

## 2021-06-03 ENCOUNTER — HOSPITAL ENCOUNTER (OUTPATIENT)
Dept: RADIOLOGY | Facility: HOSPITAL | Age: 71
Discharge: HOME | End: 2021-06-03
Attending: INTERNAL MEDICINE
Payer: MEDICARE

## 2021-06-03 VITALS
SYSTOLIC BLOOD PRESSURE: 143 MMHG | WEIGHT: 212.06 LBS | HEART RATE: 48 BPM | DIASTOLIC BLOOD PRESSURE: 77 MMHG | HEIGHT: 71 IN | BODY MASS INDEX: 29.69 KG/M2

## 2021-06-03 DIAGNOSIS — M15.9 PRIMARY OSTEOARTHRITIS INVOLVING MULTIPLE JOINTS: ICD-10-CM

## 2021-06-03 DIAGNOSIS — M35.3 POLYMYALGIA RHEUMATICA SYNDROME: ICD-10-CM

## 2021-06-03 DIAGNOSIS — M35.3 POLYMYALGIA RHEUMATICA SYNDROME: Primary | ICD-10-CM

## 2021-06-03 DIAGNOSIS — Z79.899 HIGH RISK MEDICATION USE: ICD-10-CM

## 2021-06-03 DIAGNOSIS — Z71.89 COUNSELING ON HEALTH PROMOTION AND DISEASE PREVENTION: ICD-10-CM

## 2021-06-03 PROCEDURE — 1157F PR ADVANCE CARE PLAN OR EQUIV PRESENT IN MEDICAL RECORD: ICD-10-PCS | Mod: S$GLB,,, | Performed by: INTERNAL MEDICINE

## 2021-06-03 PROCEDURE — 1126F PR PAIN SEVERITY QUANTIFIED, NO PAIN PRESENT: ICD-10-PCS | Mod: S$GLB,,, | Performed by: INTERNAL MEDICINE

## 2021-06-03 PROCEDURE — 1101F PT FALLS ASSESS-DOCD LE1/YR: CPT | Mod: S$GLB,,, | Performed by: INTERNAL MEDICINE

## 2021-06-03 PROCEDURE — 99999 PR PBB SHADOW E&M-EST. PATIENT-LVL III: ICD-10-PCS | Mod: PBBFAC,,, | Performed by: INTERNAL MEDICINE

## 2021-06-03 PROCEDURE — 3288F PR FALLS RISK ASSESSMENT DOCUMENTED: ICD-10-PCS | Mod: S$GLB,,, | Performed by: INTERNAL MEDICINE

## 2021-06-03 PROCEDURE — 1126F AMNT PAIN NOTED NONE PRSNT: CPT | Mod: S$GLB,,, | Performed by: INTERNAL MEDICINE

## 2021-06-03 PROCEDURE — 99999 PR PBB SHADOW E&M-EST. PATIENT-LVL III: CPT | Mod: PBBFAC,,, | Performed by: INTERNAL MEDICINE

## 2021-06-03 PROCEDURE — 1157F ADVNC CARE PLAN IN RCRD: CPT | Mod: S$GLB,,, | Performed by: INTERNAL MEDICINE

## 2021-06-03 PROCEDURE — 3008F BODY MASS INDEX DOCD: CPT | Mod: S$GLB,,, | Performed by: INTERNAL MEDICINE

## 2021-06-03 PROCEDURE — 1101F PR PT FALLS ASSESS DOC 0-1 FALLS W/OUT INJ PAST YR: ICD-10-PCS | Mod: S$GLB,,, | Performed by: INTERNAL MEDICINE

## 2021-06-03 PROCEDURE — 73130 XR HAND COMPLETE 3 VIEWS BILATERAL: ICD-10-PCS | Mod: 26,50,, | Performed by: RADIOLOGY

## 2021-06-03 PROCEDURE — 99214 OFFICE O/P EST MOD 30 MIN: CPT | Mod: S$GLB,,, | Performed by: INTERNAL MEDICINE

## 2021-06-03 PROCEDURE — 1159F PR MEDICATION LIST DOCUMENTED IN MEDICAL RECORD: ICD-10-PCS | Mod: S$GLB,,, | Performed by: INTERNAL MEDICINE

## 2021-06-03 PROCEDURE — 73130 X-RAY EXAM OF HAND: CPT | Mod: 26,50,, | Performed by: RADIOLOGY

## 2021-06-03 PROCEDURE — 73130 X-RAY EXAM OF HAND: CPT | Mod: TC,50

## 2021-06-03 PROCEDURE — 99214 PR OFFICE/OUTPT VISIT, EST, LEVL IV, 30-39 MIN: ICD-10-PCS | Mod: S$GLB,,, | Performed by: INTERNAL MEDICINE

## 2021-06-03 PROCEDURE — 1159F MED LIST DOCD IN RCRD: CPT | Mod: S$GLB,,, | Performed by: INTERNAL MEDICINE

## 2021-06-03 PROCEDURE — 3288F FALL RISK ASSESSMENT DOCD: CPT | Mod: S$GLB,,, | Performed by: INTERNAL MEDICINE

## 2021-06-03 PROCEDURE — 3008F PR BODY MASS INDEX (BMI) DOCUMENTED: ICD-10-PCS | Mod: S$GLB,,, | Performed by: INTERNAL MEDICINE

## 2021-06-03 RX ORDER — METHOTREXATE 2.5 MG/1
20 TABLET ORAL
Qty: 32 TABLET | Refills: 3 | Status: SHIPPED | OUTPATIENT
Start: 2021-06-03 | End: 2022-02-24

## 2021-06-03 RX ORDER — CHOLECALCIFEROL (VITAMIN D3) 25 MCG
1000 TABLET ORAL DAILY
COMMUNITY
End: 2024-01-31

## 2021-06-03 RX ORDER — PREDNISONE 5 MG/1
5 TABLET ORAL DAILY
COMMUNITY
End: 2021-09-15

## 2021-09-14 ENCOUNTER — TELEPHONE (OUTPATIENT)
Dept: RHEUMATOLOGY | Facility: CLINIC | Age: 71
End: 2021-09-14

## 2021-09-15 ENCOUNTER — PATIENT OUTREACH (OUTPATIENT)
Dept: ADMINISTRATIVE | Facility: OTHER | Age: 71
End: 2021-09-15

## 2021-09-15 ENCOUNTER — OFFICE VISIT (OUTPATIENT)
Dept: RHEUMATOLOGY | Facility: CLINIC | Age: 71
End: 2021-09-15
Payer: MEDICARE

## 2021-09-15 ENCOUNTER — LAB VISIT (OUTPATIENT)
Dept: LAB | Facility: HOSPITAL | Age: 71
End: 2021-09-15
Attending: OBSTETRICS & GYNECOLOGY
Payer: MEDICARE

## 2021-09-15 ENCOUNTER — PATIENT MESSAGE (OUTPATIENT)
Dept: RHEUMATOLOGY | Facility: CLINIC | Age: 71
End: 2021-09-15

## 2021-09-15 VITALS
WEIGHT: 202 LBS | HEIGHT: 71 IN | DIASTOLIC BLOOD PRESSURE: 69 MMHG | HEART RATE: 47 BPM | SYSTOLIC BLOOD PRESSURE: 147 MMHG | BODY MASS INDEX: 28.28 KG/M2

## 2021-09-15 DIAGNOSIS — M15.9 PRIMARY OSTEOARTHRITIS INVOLVING MULTIPLE JOINTS: ICD-10-CM

## 2021-09-15 DIAGNOSIS — Z71.89 COUNSELING ON HEALTH PROMOTION AND DISEASE PREVENTION: ICD-10-CM

## 2021-09-15 DIAGNOSIS — R53.83 FATIGUE, UNSPECIFIED TYPE: ICD-10-CM

## 2021-09-15 DIAGNOSIS — M35.3 POLYMYALGIA RHEUMATICA SYNDROME: Primary | ICD-10-CM

## 2021-09-15 DIAGNOSIS — M35.3 POLYMYALGIA RHEUMATICA SYNDROME: ICD-10-CM

## 2021-09-15 DIAGNOSIS — R76.8 AUTOANTIBODY TITER POSITIVE: ICD-10-CM

## 2021-09-15 LAB
ALBUMIN SERPL BCP-MCNC: 3.9 G/DL (ref 3.5–5.2)
ALP SERPL-CCNC: 72 U/L (ref 55–135)
ALT SERPL W/O P-5'-P-CCNC: 30 U/L (ref 10–44)
ANION GAP SERPL CALC-SCNC: 10 MMOL/L (ref 8–16)
AST SERPL-CCNC: 39 U/L (ref 10–40)
BASOPHILS # BLD AUTO: 0.03 K/UL (ref 0–0.2)
BASOPHILS NFR BLD: 0.5 % (ref 0–1.9)
BILIRUB SERPL-MCNC: 1 MG/DL (ref 0.1–1)
BUN SERPL-MCNC: 17 MG/DL (ref 8–23)
CALCIUM SERPL-MCNC: 9.9 MG/DL (ref 8.7–10.5)
CHLORIDE SERPL-SCNC: 105 MMOL/L (ref 95–110)
CO2 SERPL-SCNC: 25 MMOL/L (ref 23–29)
CREAT SERPL-MCNC: 1.1 MG/DL (ref 0.5–1.4)
CRP SERPL-MCNC: 5.2 MG/L (ref 0–8.2)
DIFFERENTIAL METHOD: NORMAL
EOSINOPHIL # BLD AUTO: 0.2 K/UL (ref 0–0.5)
EOSINOPHIL NFR BLD: 2.3 % (ref 0–8)
ERYTHROCYTE [DISTWIDTH] IN BLOOD BY AUTOMATED COUNT: 13.3 % (ref 11.5–14.5)
ERYTHROCYTE [SEDIMENTATION RATE] IN BLOOD BY WESTERGREN METHOD: 4 MM/HR (ref 0–10)
EST. GFR  (AFRICAN AMERICAN): >60 ML/MIN/1.73 M^2
EST. GFR  (NON AFRICAN AMERICAN): >60 ML/MIN/1.73 M^2
GLUCOSE SERPL-MCNC: 91 MG/DL (ref 70–110)
HCT VFR BLD AUTO: 42.1 % (ref 40–54)
HGB BLD-MCNC: 14 G/DL (ref 14–18)
IMM GRANULOCYTES # BLD AUTO: 0.01 K/UL (ref 0–0.04)
IMM GRANULOCYTES NFR BLD AUTO: 0.2 % (ref 0–0.5)
LYMPHOCYTES # BLD AUTO: 1.5 K/UL (ref 1–4.8)
LYMPHOCYTES NFR BLD: 23.8 % (ref 18–48)
MCH RBC QN AUTO: 29.6 PG (ref 27–31)
MCHC RBC AUTO-ENTMCNC: 33.3 G/DL (ref 32–36)
MCV RBC AUTO: 89 FL (ref 82–98)
MONOCYTES # BLD AUTO: 0.5 K/UL (ref 0.3–1)
MONOCYTES NFR BLD: 7.5 % (ref 4–15)
NEUTROPHILS # BLD AUTO: 4.2 K/UL (ref 1.8–7.7)
NEUTROPHILS NFR BLD: 65.7 % (ref 38–73)
NRBC BLD-RTO: 0 /100 WBC
PLATELET # BLD AUTO: 224 K/UL (ref 150–450)
PMV BLD AUTO: 9.3 FL (ref 9.2–12.9)
POTASSIUM SERPL-SCNC: 4.6 MMOL/L (ref 3.5–5.1)
PROT SERPL-MCNC: 7.2 G/DL (ref 6–8.4)
RBC # BLD AUTO: 4.73 M/UL (ref 4.6–6.2)
SODIUM SERPL-SCNC: 140 MMOL/L (ref 136–145)
WBC # BLD AUTO: 6.43 K/UL (ref 3.9–12.7)

## 2021-09-15 PROCEDURE — 36415 COLL VENOUS BLD VENIPUNCTURE: CPT | Performed by: INTERNAL MEDICINE

## 2021-09-15 PROCEDURE — 86140 C-REACTIVE PROTEIN: CPT | Performed by: INTERNAL MEDICINE

## 2021-09-15 PROCEDURE — 1101F PR PT FALLS ASSESS DOC 0-1 FALLS W/OUT INJ PAST YR: ICD-10-PCS | Mod: S$GLB,,, | Performed by: INTERNAL MEDICINE

## 2021-09-15 PROCEDURE — 1125F AMNT PAIN NOTED PAIN PRSNT: CPT | Mod: S$GLB,,, | Performed by: INTERNAL MEDICINE

## 2021-09-15 PROCEDURE — 3077F SYST BP >= 140 MM HG: CPT | Mod: S$GLB,,, | Performed by: INTERNAL MEDICINE

## 2021-09-15 PROCEDURE — 85025 COMPLETE CBC W/AUTO DIFF WBC: CPT | Performed by: INTERNAL MEDICINE

## 2021-09-15 PROCEDURE — 1101F PT FALLS ASSESS-DOCD LE1/YR: CPT | Mod: S$GLB,,, | Performed by: INTERNAL MEDICINE

## 2021-09-15 PROCEDURE — 3078F PR MOST RECENT DIASTOLIC BLOOD PRESSURE < 80 MM HG: ICD-10-PCS | Mod: S$GLB,,, | Performed by: INTERNAL MEDICINE

## 2021-09-15 PROCEDURE — 3008F BODY MASS INDEX DOCD: CPT | Mod: S$GLB,,, | Performed by: INTERNAL MEDICINE

## 2021-09-15 PROCEDURE — 3288F FALL RISK ASSESSMENT DOCD: CPT | Mod: S$GLB,,, | Performed by: INTERNAL MEDICINE

## 2021-09-15 PROCEDURE — 1125F PR PAIN SEVERITY QUANTIFIED, PAIN PRESENT: ICD-10-PCS | Mod: S$GLB,,, | Performed by: INTERNAL MEDICINE

## 2021-09-15 PROCEDURE — 1157F PR ADVANCE CARE PLAN OR EQUIV PRESENT IN MEDICAL RECORD: ICD-10-PCS | Mod: S$GLB,,, | Performed by: INTERNAL MEDICINE

## 2021-09-15 PROCEDURE — 1159F MED LIST DOCD IN RCRD: CPT | Mod: S$GLB,,, | Performed by: INTERNAL MEDICINE

## 2021-09-15 PROCEDURE — 99999 PR PBB SHADOW E&M-EST. PATIENT-LVL III: CPT | Mod: PBBFAC,,, | Performed by: INTERNAL MEDICINE

## 2021-09-15 PROCEDURE — 3288F PR FALLS RISK ASSESSMENT DOCUMENTED: ICD-10-PCS | Mod: S$GLB,,, | Performed by: INTERNAL MEDICINE

## 2021-09-15 PROCEDURE — 3077F PR MOST RECENT SYSTOLIC BLOOD PRESSURE >= 140 MM HG: ICD-10-PCS | Mod: S$GLB,,, | Performed by: INTERNAL MEDICINE

## 2021-09-15 PROCEDURE — 1160F PR REVIEW ALL MEDS BY PRESCRIBER/CLIN PHARMACIST DOCUMENTED: ICD-10-PCS | Mod: S$GLB,,, | Performed by: INTERNAL MEDICINE

## 2021-09-15 PROCEDURE — 99214 PR OFFICE/OUTPT VISIT, EST, LEVL IV, 30-39 MIN: ICD-10-PCS | Mod: S$GLB,,, | Performed by: INTERNAL MEDICINE

## 2021-09-15 PROCEDURE — 1159F PR MEDICATION LIST DOCUMENTED IN MEDICAL RECORD: ICD-10-PCS | Mod: S$GLB,,, | Performed by: INTERNAL MEDICINE

## 2021-09-15 PROCEDURE — 99999 PR PBB SHADOW E&M-EST. PATIENT-LVL III: ICD-10-PCS | Mod: PBBFAC,,, | Performed by: INTERNAL MEDICINE

## 2021-09-15 PROCEDURE — 1160F RVW MEDS BY RX/DR IN RCRD: CPT | Mod: S$GLB,,, | Performed by: INTERNAL MEDICINE

## 2021-09-15 PROCEDURE — 1157F ADVNC CARE PLAN IN RCRD: CPT | Mod: S$GLB,,, | Performed by: INTERNAL MEDICINE

## 2021-09-15 PROCEDURE — 99214 OFFICE O/P EST MOD 30 MIN: CPT | Mod: S$GLB,,, | Performed by: INTERNAL MEDICINE

## 2021-09-15 PROCEDURE — 3078F DIAST BP <80 MM HG: CPT | Mod: S$GLB,,, | Performed by: INTERNAL MEDICINE

## 2021-09-15 PROCEDURE — 85651 RBC SED RATE NONAUTOMATED: CPT | Performed by: INTERNAL MEDICINE

## 2021-09-15 PROCEDURE — 80053 COMPREHEN METABOLIC PANEL: CPT | Performed by: INTERNAL MEDICINE

## 2021-09-15 PROCEDURE — 3008F PR BODY MASS INDEX (BMI) DOCUMENTED: ICD-10-PCS | Mod: S$GLB,,, | Performed by: INTERNAL MEDICINE

## 2021-09-15 RX ORDER — HYDROXYCHLOROQUINE SULFATE 200 MG/1
200 TABLET, FILM COATED ORAL 2 TIMES DAILY
Qty: 60 TABLET | Refills: 3 | Status: SHIPPED | OUTPATIENT
Start: 2021-09-15 | End: 2021-10-15

## 2021-10-04 ENCOUNTER — PATIENT MESSAGE (OUTPATIENT)
Dept: ADMINISTRATIVE | Facility: HOSPITAL | Age: 71
End: 2021-10-04

## 2021-10-06 ENCOUNTER — PATIENT OUTREACH (OUTPATIENT)
Dept: ADMINISTRATIVE | Facility: HOSPITAL | Age: 71
End: 2021-10-06

## 2022-02-17 ENCOUNTER — LAB VISIT (OUTPATIENT)
Dept: LAB | Facility: HOSPITAL | Age: 72
End: 2022-02-17
Attending: INTERNAL MEDICINE
Payer: MEDICARE

## 2022-02-17 DIAGNOSIS — R53.83 FATIGUE, UNSPECIFIED TYPE: ICD-10-CM

## 2022-02-17 DIAGNOSIS — M35.3 POLYMYALGIA RHEUMATICA SYNDROME: ICD-10-CM

## 2022-02-17 LAB
ALBUMIN SERPL BCP-MCNC: 4 G/DL (ref 3.5–5.2)
ALP SERPL-CCNC: 65 U/L (ref 55–135)
ALT SERPL W/O P-5'-P-CCNC: 15 U/L (ref 10–44)
ANION GAP SERPL CALC-SCNC: 10 MMOL/L (ref 8–16)
AST SERPL-CCNC: 24 U/L (ref 10–40)
BASOPHILS # BLD AUTO: 0.03 K/UL (ref 0–0.2)
BASOPHILS NFR BLD: 0.5 % (ref 0–1.9)
BILIRUB SERPL-MCNC: 0.8 MG/DL (ref 0.1–1)
BUN SERPL-MCNC: 17 MG/DL (ref 8–23)
CALCIUM SERPL-MCNC: 9.7 MG/DL (ref 8.7–10.5)
CHLORIDE SERPL-SCNC: 104 MMOL/L (ref 95–110)
CO2 SERPL-SCNC: 27 MMOL/L (ref 23–29)
CREAT SERPL-MCNC: 1.2 MG/DL (ref 0.5–1.4)
CRP SERPL-MCNC: 1.2 MG/L (ref 0–8.2)
DIFFERENTIAL METHOD: NORMAL
EOSINOPHIL # BLD AUTO: 0.2 K/UL (ref 0–0.5)
EOSINOPHIL NFR BLD: 3.2 % (ref 0–8)
ERYTHROCYTE [DISTWIDTH] IN BLOOD BY AUTOMATED COUNT: 12.6 % (ref 11.5–14.5)
ERYTHROCYTE [SEDIMENTATION RATE] IN BLOOD BY WESTERGREN METHOD: <2 MM/HR (ref 0–23)
EST. GFR  (AFRICAN AMERICAN): >60 ML/MIN/1.73 M^2
EST. GFR  (NON AFRICAN AMERICAN): >60 ML/MIN/1.73 M^2
GLUCOSE SERPL-MCNC: 89 MG/DL (ref 70–110)
HCT VFR BLD AUTO: 44.4 % (ref 40–54)
HGB BLD-MCNC: 15 G/DL (ref 14–18)
IMM GRANULOCYTES # BLD AUTO: 0.01 K/UL (ref 0–0.04)
IMM GRANULOCYTES NFR BLD AUTO: 0.2 % (ref 0–0.5)
LYMPHOCYTES # BLD AUTO: 2 K/UL (ref 1–4.8)
LYMPHOCYTES NFR BLD: 30.1 % (ref 18–48)
MCH RBC QN AUTO: 28.9 PG (ref 27–31)
MCHC RBC AUTO-ENTMCNC: 33.8 G/DL (ref 32–36)
MCV RBC AUTO: 86 FL (ref 82–98)
MONOCYTES # BLD AUTO: 0.6 K/UL (ref 0.3–1)
MONOCYTES NFR BLD: 8.7 % (ref 4–15)
NEUTROPHILS # BLD AUTO: 3.8 K/UL (ref 1.8–7.7)
NEUTROPHILS NFR BLD: 57.5 % (ref 38–73)
NRBC BLD-RTO: 0 /100 WBC
PLATELET # BLD AUTO: 201 K/UL (ref 150–450)
PMV BLD AUTO: 9.7 FL (ref 9.2–12.9)
POTASSIUM SERPL-SCNC: 4.5 MMOL/L (ref 3.5–5.1)
PROT SERPL-MCNC: 7 G/DL (ref 6–8.4)
RBC # BLD AUTO: 5.19 M/UL (ref 4.6–6.2)
SODIUM SERPL-SCNC: 141 MMOL/L (ref 136–145)
WBC # BLD AUTO: 6.54 K/UL (ref 3.9–12.7)

## 2022-02-17 PROCEDURE — 85025 COMPLETE CBC W/AUTO DIFF WBC: CPT | Mod: PO | Performed by: INTERNAL MEDICINE

## 2022-02-17 PROCEDURE — 80053 COMPREHEN METABOLIC PANEL: CPT | Performed by: INTERNAL MEDICINE

## 2022-02-17 PROCEDURE — 36415 COLL VENOUS BLD VENIPUNCTURE: CPT | Mod: PO | Performed by: INTERNAL MEDICINE

## 2022-02-17 PROCEDURE — 85652 RBC SED RATE AUTOMATED: CPT | Performed by: INTERNAL MEDICINE

## 2022-02-17 PROCEDURE — 80074 ACUTE HEPATITIS PANEL: CPT | Performed by: INTERNAL MEDICINE

## 2022-02-17 PROCEDURE — 86140 C-REACTIVE PROTEIN: CPT | Performed by: INTERNAL MEDICINE

## 2022-02-17 PROCEDURE — 86480 TB TEST CELL IMMUN MEASURE: CPT | Performed by: INTERNAL MEDICINE

## 2022-02-21 LAB
HAV IGM SERPL QL IA: NEGATIVE
HBV CORE IGM SERPL QL IA: NEGATIVE
HBV SURFACE AG SERPL QL IA: NEGATIVE
HCV AB SERPL QL IA: NEGATIVE

## 2022-02-22 LAB
GAMMA INTERFERON BACKGROUND BLD IA-ACNC: 0.1 IU/ML
M TB IFN-G CD4+ BCKGRND COR BLD-ACNC: 0.01 IU/ML
M TB IFN-G CD4+CD8+ BCKGRND COR BLD-ACNC: -0.02 IU/ML
MITOGEN IGNF BCKGRD COR BLD-ACNC: >10 IU/ML
TB GOLD PLUS: NEGATIVE

## 2022-02-23 ENCOUNTER — PATIENT OUTREACH (OUTPATIENT)
Dept: ADMINISTRATIVE | Facility: OTHER | Age: 72
End: 2022-02-23
Payer: MEDICARE

## 2022-02-24 ENCOUNTER — OFFICE VISIT (OUTPATIENT)
Dept: RHEUMATOLOGY | Facility: CLINIC | Age: 72
End: 2022-02-24
Payer: MEDICARE

## 2022-02-24 VITALS
BODY MASS INDEX: 29.29 KG/M2 | SYSTOLIC BLOOD PRESSURE: 148 MMHG | DIASTOLIC BLOOD PRESSURE: 83 MMHG | WEIGHT: 209.19 LBS | HEIGHT: 71 IN | HEART RATE: 55 BPM

## 2022-02-24 DIAGNOSIS — Z71.89 COUNSELING ON HEALTH PROMOTION AND DISEASE PREVENTION: ICD-10-CM

## 2022-02-24 DIAGNOSIS — R76.8 AUTOANTIBODY TITER POSITIVE: ICD-10-CM

## 2022-02-24 DIAGNOSIS — M35.3 POLYMYALGIA RHEUMATICA SYNDROME: Primary | ICD-10-CM

## 2022-02-24 PROCEDURE — 99999 PR PBB SHADOW E&M-EST. PATIENT-LVL III: CPT | Mod: PBBFAC,,, | Performed by: INTERNAL MEDICINE

## 2022-02-24 PROCEDURE — 1101F PR PT FALLS ASSESS DOC 0-1 FALLS W/OUT INJ PAST YR: ICD-10-PCS | Mod: CPTII,S$GLB,, | Performed by: INTERNAL MEDICINE

## 2022-02-24 PROCEDURE — 1126F PR PAIN SEVERITY QUANTIFIED, NO PAIN PRESENT: ICD-10-PCS | Mod: CPTII,S$GLB,, | Performed by: INTERNAL MEDICINE

## 2022-02-24 PROCEDURE — 1159F MED LIST DOCD IN RCRD: CPT | Mod: CPTII,S$GLB,, | Performed by: INTERNAL MEDICINE

## 2022-02-24 PROCEDURE — 3008F PR BODY MASS INDEX (BMI) DOCUMENTED: ICD-10-PCS | Mod: CPTII,S$GLB,, | Performed by: INTERNAL MEDICINE

## 2022-02-24 PROCEDURE — 1157F ADVNC CARE PLAN IN RCRD: CPT | Mod: CPTII,S$GLB,, | Performed by: INTERNAL MEDICINE

## 2022-02-24 PROCEDURE — 3077F SYST BP >= 140 MM HG: CPT | Mod: CPTII,S$GLB,, | Performed by: INTERNAL MEDICINE

## 2022-02-24 PROCEDURE — 3288F PR FALLS RISK ASSESSMENT DOCUMENTED: ICD-10-PCS | Mod: CPTII,S$GLB,, | Performed by: INTERNAL MEDICINE

## 2022-02-24 PROCEDURE — 1159F PR MEDICATION LIST DOCUMENTED IN MEDICAL RECORD: ICD-10-PCS | Mod: CPTII,S$GLB,, | Performed by: INTERNAL MEDICINE

## 2022-02-24 PROCEDURE — 3079F DIAST BP 80-89 MM HG: CPT | Mod: CPTII,S$GLB,, | Performed by: INTERNAL MEDICINE

## 2022-02-24 PROCEDURE — 3288F FALL RISK ASSESSMENT DOCD: CPT | Mod: CPTII,S$GLB,, | Performed by: INTERNAL MEDICINE

## 2022-02-24 PROCEDURE — 99214 OFFICE O/P EST MOD 30 MIN: CPT | Mod: S$GLB,,, | Performed by: INTERNAL MEDICINE

## 2022-02-24 PROCEDURE — 1157F PR ADVANCE CARE PLAN OR EQUIV PRESENT IN MEDICAL RECORD: ICD-10-PCS | Mod: CPTII,S$GLB,, | Performed by: INTERNAL MEDICINE

## 2022-02-24 PROCEDURE — 3008F BODY MASS INDEX DOCD: CPT | Mod: CPTII,S$GLB,, | Performed by: INTERNAL MEDICINE

## 2022-02-24 PROCEDURE — 3079F PR MOST RECENT DIASTOLIC BLOOD PRESSURE 80-89 MM HG: ICD-10-PCS | Mod: CPTII,S$GLB,, | Performed by: INTERNAL MEDICINE

## 2022-02-24 PROCEDURE — 99214 PR OFFICE/OUTPT VISIT, EST, LEVL IV, 30-39 MIN: ICD-10-PCS | Mod: S$GLB,,, | Performed by: INTERNAL MEDICINE

## 2022-02-24 PROCEDURE — 3077F PR MOST RECENT SYSTOLIC BLOOD PRESSURE >= 140 MM HG: ICD-10-PCS | Mod: CPTII,S$GLB,, | Performed by: INTERNAL MEDICINE

## 2022-02-24 PROCEDURE — 1126F AMNT PAIN NOTED NONE PRSNT: CPT | Mod: CPTII,S$GLB,, | Performed by: INTERNAL MEDICINE

## 2022-02-24 PROCEDURE — 99999 PR PBB SHADOW E&M-EST. PATIENT-LVL III: ICD-10-PCS | Mod: PBBFAC,,, | Performed by: INTERNAL MEDICINE

## 2022-02-24 PROCEDURE — 1101F PT FALLS ASSESS-DOCD LE1/YR: CPT | Mod: CPTII,S$GLB,, | Performed by: INTERNAL MEDICINE

## 2022-02-24 RX ORDER — PREDNISONE 10 MG/1
10 TABLET ORAL DAILY PRN
Qty: 10 TABLET | Refills: 0 | Status: SHIPPED | OUTPATIENT
Start: 2022-02-24 | End: 2022-03-03

## 2022-02-24 RX ORDER — HYDROXYCHLOROQUINE SULFATE 200 MG/1
200 TABLET, FILM COATED ORAL 2 TIMES DAILY
Qty: 180 TABLET | Refills: 1 | Status: SHIPPED | OUTPATIENT
Start: 2022-02-24 | End: 2022-05-25

## 2022-02-24 NOTE — PROGRESS NOTES
RHEUMATOLOGY OUTPATIENT CLINIC NOTE    2/24/2022    Attending Rheumatologist: Santos Casper  Primary Care Provider/Physician Requesting Consultation: Sergio Quick MD   Chief Complaint/Reason For Consultation:  No chief complaint on file.      Subjective:     Wilbert Luna is a 71 y.o. White male with mild rheumatica syndrome for follow-up visit.    No acute complaints.  Denies arthralgias or prolonged stiffness.  Interval self discontinuation methotrexate on October.  Taking Plaquenil intermittently.  No using systemic steroids.  Denies headaches or visual symptoms.  Does not have chest pain, acute respiratory symptoms, /GI complaints.    Review of Systems   Constitutional: Negative for fever and malaise/fatigue.   Eyes: Negative for photophobia and pain.   Musculoskeletal: Negative for joint pain.   Neurological: Negative for focal weakness.       Chronic comorbid conditions affecting medical decision making today:  Past Medical History:   Diagnosis Date    Cancer skin cancer on head     Past Surgical History:   Procedure Laterality Date    COLONOSCOPY N/A 6/5/2019    Procedure: COLONOSCOPY;  Surgeon: Kaiden Gallagher III, MD;  Location: [facility];  Service: Endoscopy;  Laterality: N/A;    HERNIA REPAIR      SPINE SURGERY       Family History   Problem Relation Age of Onset    Hypertension Mother     Cancer Father     Hypertension Maternal Grandmother     Thyroid disease Maternal Grandmother     Amblyopia Neg Hx     Blindness Neg Hx     Cataracts Neg Hx     Diabetes Neg Hx     Glaucoma Neg Hx     Macular degeneration Neg Hx     Retinal detachment Neg Hx     Strabismus Neg Hx     Stroke Neg Hx      Social History     Substance and Sexual Activity   Alcohol Use No     Social History     Tobacco Use   Smoking Status Never Smoker   Smokeless Tobacco Never Used     Social History     Substance and Sexual Activity   Drug Use No       Current Outpatient Medications:     esomeprazole  magnesium (NEXIUM) 10 mg suspension, Take 10 mg by mouth before breakfast., Disp: , Rfl:     fluticasone propionate (FLONASE) 50 mcg/actuation nasal spray, 2 sprays by Each Nostril route once daily., Disp: , Rfl:     mupirocin (BACTROBAN) 2 % ointment, RAFA TO NOSE BID FOR 7 DAYS, Disp: , Rfl:     vitamin D (VITAMIN D3) 1000 units Tab, Take 1,000 Units by mouth once daily., Disp: , Rfl:     hydrOXYchloroQUINE (PLAQUENIL) 200 mg tablet, Take 1 tablet (200 mg total) by mouth 2 (two) times daily., Disp: 180 tablet, Rfl: 1    predniSONE (DELTASONE) 10 MG tablet, Take 1 tablet (10 mg total) by mouth daily as needed (For inflammatory arthritis flares)., Disp: 10 tablet, Rfl: 0     Objective:     Vitals:    02/24/22 0734   BP: (!) 148/83   Pulse: (!) 55     Physical Exam   Constitutional: He does not appear ill.   Eyes: Conjunctivae are normal.   Pulmonary/Chest: Effort normal.   Musculoskeletal:         General: No swelling or tenderness. Normal range of motion.   Neurological: He displays no weakness.       Reviewed available old and all outside pertinent medical records available.    All lab results personally reviewed and interpreted by me.  Lab Results   Component Value Date    WBC 6.54 02/17/2022    HGB 15.0 02/17/2022    HCT 44.4 02/17/2022    MCV 86 02/17/2022    RDW 12.6 02/17/2022     02/17/2022       Lab Results   Component Value Date     02/17/2022    K 4.5 02/17/2022     02/17/2022    CO2 27 02/17/2022    GLU 89 02/17/2022    BUN 17 02/17/2022    CALCIUM 9.7 02/17/2022    PROT 7.0 02/17/2022    ALBUMIN 4.0 02/17/2022    BILITOT 0.8 02/17/2022    AST 24 02/17/2022    ALKPHOS 65 02/17/2022    ALT 15 02/17/2022       Lab Results   Component Value Date    COLORU Yellow 09/17/2016    APPEARANCEUA Clear 09/17/2016    SPECGRAV 1.020 09/17/2016    PHUR 6.0 09/17/2016    PROTEINUA 2+ (A) 09/17/2016    KETONESU Trace (A) 09/17/2016    LEUKOCYTESUR Negative 09/17/2016    NITRITE Negative 09/17/2016     UROBILINOGEN 2.0-3.0 (A) 09/17/2016       No results found for: PTH    No results found for: URICACID    Lab Results   Component Value Date    CRP 1.2 02/17/2022       No results found for: ANATITER    No components found for: TSPOTTB,  QUANTIFERON     ASSESSMENT:     Polymyalgia rheumatica syndrome that appears to be clinically quiescent.  Differential diagnosis of early rheumatoid arthritis.  Imaging on file mostly consistent with osteoarthritis, no marginal erosions or ankylosis present.  Patient doing great clinically, currently without active synovitis.  Acute phase reactants are normal.  Interval self discontinuation methotrexate on October after perceiving doing very well.  Will provide with 10 mg of prednisone to take for refractory arthralgias with inflammatory features.  Recommend to continue taking Plaquenil for the time being.  Patient will inform clinic if needs to take prednisone more than 3 days for which would recommend to resume MTX.  Repeat labs including rheumatoid factor prior next visit.  Follow with eye clinic to monitor for retinal toxicity related to chronic Plaquenil use.    PLAN:     1. Polymyalgia rheumatica syndrome    - CBC Auto Differential; Standing  - Comprehensive Metabolic Panel; Standing  - C-Reactive Protein; Standing  - Sedimentation rate; Standing  - Rheumatoid Factor; Future  - hydrOXYchloroQUINE (PLAQUENIL) 200 mg tablet; Take 1 tablet (200 mg total) by mouth 2 (two) times daily.  Dispense: 180 tablet; Refill: 1  - predniSONE (DELTASONE) 10 MG tablet; Take 1 tablet (10 mg total) by mouth daily as needed (For inflammatory arthritis flares).  Dispense: 10 tablet; Refill: 0    2. Other specified counseling      Follow up in about 6 months (around 8/24/2022).      Disclaimer: This note is prepared using voice recognition software and as such is likely to have errors and has not been proof read. Please contact me for questions.     30 minutes of total time spent on the  encounter, which includes face to face time and non-face to face time preparing to see the patient (eg, review of tests), Obtaining and/or reviewing separately obtained history, Documenting clinical information in the electronic or other health record, Independently interpreting results (not separately reported) and communicating results to the patient/family/caregiver, or Care coordination (not separately reported).     Santos Casper M.D.

## 2022-02-24 NOTE — PROGRESS NOTES
Health Maintenance Due   Topic Date Due    COVID-19 Vaccine (1) Never done    Shingles Vaccine (1 of 2) Never done    Pneumococcal Vaccines (Age 65+) (2 of 2 - PPSV23) 04/29/2020     Updates were requested from care everywhere.  Chart was reviewed for overdue Proactive [facility] Encounters ([facility]) topics (CRS, Breast Cancer Screening, Eye exam)  Health Maintenance has been updated.  LINKS immunization registry triggered.  Immunizations were reconciled.

## 2022-03-16 ENCOUNTER — TELEPHONE (OUTPATIENT)
Dept: FAMILY MEDICINE | Facility: CLINIC | Age: 72
End: 2022-03-16
Payer: MEDICARE

## 2022-03-16 NOTE — TELEPHONE ENCOUNTER
----- Message from Nakul Vyas sent at 3/16/2022  3:00 PM CDT -----  Contact: pt  Pt is calling to see if labs are needed prior to his appt on 03/21 and can be reached at [phone]//[email or web address]

## 2022-03-21 ENCOUNTER — HOSPITAL ENCOUNTER (OUTPATIENT)
Dept: RADIOLOGY | Facility: HOSPITAL | Age: 72
Discharge: HOME | End: 2022-03-21
Attending: FAMILY MEDICINE
Payer: MEDICARE

## 2022-03-21 ENCOUNTER — OFFICE VISIT (OUTPATIENT)
Dept: FAMILY MEDICINE | Facility: CLINIC | Age: 72
End: 2022-03-21
Payer: MEDICARE

## 2022-03-21 ENCOUNTER — TELEPHONE (OUTPATIENT)
Dept: FAMILY MEDICINE | Facility: CLINIC | Age: 72
End: 2022-03-21
Payer: MEDICARE

## 2022-03-21 VITALS
HEIGHT: 72 IN | BODY MASS INDEX: 27.9 KG/M2 | TEMPERATURE: 98 F | DIASTOLIC BLOOD PRESSURE: 80 MMHG | WEIGHT: 206 LBS | RESPIRATION RATE: 18 BRPM | SYSTOLIC BLOOD PRESSURE: 136 MMHG | HEART RATE: 50 BPM

## 2022-03-21 DIAGNOSIS — R07.81 RIB PAIN ON RIGHT SIDE: Primary | ICD-10-CM

## 2022-03-21 DIAGNOSIS — B35.1 ONYCHOMYCOSIS: ICD-10-CM

## 2022-03-21 DIAGNOSIS — M35.3 POLYMYALGIA RHEUMATICA: ICD-10-CM

## 2022-03-21 DIAGNOSIS — Z23 IMMUNIZATION DUE: ICD-10-CM

## 2022-03-21 DIAGNOSIS — Z13.1 SCREENING FOR DIABETES MELLITUS: ICD-10-CM

## 2022-03-21 DIAGNOSIS — Z12.5 ENCOUNTER FOR PROSTATE CANCER SCREENING: ICD-10-CM

## 2022-03-21 DIAGNOSIS — Z13.220 ENCOUNTER FOR LIPID SCREENING FOR CARDIOVASCULAR DISEASE: ICD-10-CM

## 2022-03-21 DIAGNOSIS — Z13.6 ENCOUNTER FOR LIPID SCREENING FOR CARDIOVASCULAR DISEASE: ICD-10-CM

## 2022-03-21 DIAGNOSIS — R07.81 RIB PAIN ON RIGHT SIDE: ICD-10-CM

## 2022-03-21 LAB
BILIRUB UR QL STRIP: NEGATIVE
CLARITY UR: CLEAR
COLOR UR: NORMAL
GLUCOSE UR QL STRIP: NEGATIVE
HGB UR QL STRIP: NEGATIVE
KETONES UR QL STRIP: NEGATIVE
LEUKOCYTE ESTERASE UR QL STRIP: NEGATIVE
NITRITE UR QL STRIP: NEGATIVE
PH UR STRIP: 6 [PH] (ref 5–8)
PROT UR QL STRIP: NEGATIVE
SP GR UR STRIP: 1.01 (ref 1–1.03)
URN SPEC COLLECT METH UR: NORMAL

## 2022-03-21 PROCEDURE — 1101F PT FALLS ASSESS-DOCD LE1/YR: CPT | Mod: CPTII,S$GLB,, | Performed by: FAMILY MEDICINE

## 2022-03-21 PROCEDURE — 3079F DIAST BP 80-89 MM HG: CPT | Mod: CPTII,S$GLB,, | Performed by: FAMILY MEDICINE

## 2022-03-21 PROCEDURE — 1159F PR MEDICATION LIST DOCUMENTED IN MEDICAL RECORD: ICD-10-PCS | Mod: CPTII,S$GLB,, | Performed by: FAMILY MEDICINE

## 2022-03-21 PROCEDURE — 3079F PR MOST RECENT DIASTOLIC BLOOD PRESSURE 80-89 MM HG: ICD-10-PCS | Mod: CPTII,S$GLB,, | Performed by: FAMILY MEDICINE

## 2022-03-21 PROCEDURE — 99214 OFFICE O/P EST MOD 30 MIN: CPT | Mod: S$GLB,,, | Performed by: FAMILY MEDICINE

## 2022-03-21 PROCEDURE — 99999 PR PBB SHADOW E&M-EST. PATIENT-LVL IV: CPT | Mod: PBBFAC,,, | Performed by: FAMILY MEDICINE

## 2022-03-21 PROCEDURE — 3288F FALL RISK ASSESSMENT DOCD: CPT | Mod: CPTII,S$GLB,, | Performed by: FAMILY MEDICINE

## 2022-03-21 PROCEDURE — 1101F PR PT FALLS ASSESS DOC 0-1 FALLS W/OUT INJ PAST YR: ICD-10-PCS | Mod: CPTII,S$GLB,, | Performed by: FAMILY MEDICINE

## 2022-03-21 PROCEDURE — 90732 PPSV23 VACC 2 YRS+ SUBQ/IM: CPT | Mod: S$GLB,,, | Performed by: FAMILY MEDICINE

## 2022-03-21 PROCEDURE — 3008F BODY MASS INDEX DOCD: CPT | Mod: CPTII,S$GLB,, | Performed by: FAMILY MEDICINE

## 2022-03-21 PROCEDURE — 1125F PR PAIN SEVERITY QUANTIFIED, PAIN PRESENT: ICD-10-PCS | Mod: CPTII,S$GLB,, | Performed by: FAMILY MEDICINE

## 2022-03-21 PROCEDURE — 71100 XR RIBS 2 VIEW RIGHT: ICD-10-PCS | Mod: 26,RT,, | Performed by: RADIOLOGY

## 2022-03-21 PROCEDURE — 3288F PR FALLS RISK ASSESSMENT DOCUMENTED: ICD-10-PCS | Mod: CPTII,S$GLB,, | Performed by: FAMILY MEDICINE

## 2022-03-21 PROCEDURE — 99214 PR OFFICE/OUTPT VISIT, EST, LEVL IV, 30-39 MIN: ICD-10-PCS | Mod: S$GLB,,, | Performed by: FAMILY MEDICINE

## 2022-03-21 PROCEDURE — 99999 PR PBB SHADOW E&M-EST. PATIENT-LVL IV: ICD-10-PCS | Mod: PBBFAC,,, | Performed by: FAMILY MEDICINE

## 2022-03-21 PROCEDURE — 3008F PR BODY MASS INDEX (BMI) DOCUMENTED: ICD-10-PCS | Mod: CPTII,S$GLB,, | Performed by: FAMILY MEDICINE

## 2022-03-21 PROCEDURE — 1157F PR ADVANCE CARE PLAN OR EQUIV PRESENT IN MEDICAL RECORD: ICD-10-PCS | Mod: CPTII,S$GLB,, | Performed by: FAMILY MEDICINE

## 2022-03-21 PROCEDURE — 1125F AMNT PAIN NOTED PAIN PRSNT: CPT | Mod: CPTII,S$GLB,, | Performed by: FAMILY MEDICINE

## 2022-03-21 PROCEDURE — G0009 ADMIN PNEUMOCOCCAL VACCINE: HCPCS | Mod: S$GLB,,, | Performed by: FAMILY MEDICINE

## 2022-03-21 PROCEDURE — 71100 X-RAY EXAM RIBS UNI 2 VIEWS: CPT | Mod: 26,RT,, | Performed by: RADIOLOGY

## 2022-03-21 PROCEDURE — 90732 PNEUMOCOCCAL POLYSACCHARIDE VACCINE 23-VALENT =>2YO SQ IM: ICD-10-PCS | Mod: S$GLB,,, | Performed by: FAMILY MEDICINE

## 2022-03-21 PROCEDURE — G0009 PNEUMOCOCCAL POLYSACCHARIDE VACCINE 23-VALENT =>2YO SQ IM: ICD-10-PCS | Mod: S$GLB,,, | Performed by: FAMILY MEDICINE

## 2022-03-21 PROCEDURE — 3075F SYST BP GE 130 - 139MM HG: CPT | Mod: CPTII,S$GLB,, | Performed by: FAMILY MEDICINE

## 2022-03-21 PROCEDURE — 3075F PR MOST RECENT SYSTOLIC BLOOD PRESS GE 130-139MM HG: ICD-10-PCS | Mod: CPTII,S$GLB,, | Performed by: FAMILY MEDICINE

## 2022-03-21 PROCEDURE — 81003 URINALYSIS AUTO W/O SCOPE: CPT | Mod: PO | Performed by: FAMILY MEDICINE

## 2022-03-21 PROCEDURE — 71100 X-RAY EXAM RIBS UNI 2 VIEWS: CPT | Mod: TC,PO,RT

## 2022-03-21 PROCEDURE — 1159F MED LIST DOCD IN RCRD: CPT | Mod: CPTII,S$GLB,, | Performed by: FAMILY MEDICINE

## 2022-03-21 PROCEDURE — 1157F ADVNC CARE PLAN IN RCRD: CPT | Mod: CPTII,S$GLB,, | Performed by: FAMILY MEDICINE

## 2022-03-21 RX ORDER — TERBINAFINE HYDROCHLORIDE 250 MG/1
250 TABLET ORAL DAILY
Qty: 90 TABLET | Refills: 0 | Status: SHIPPED | OUTPATIENT
Start: 2022-03-21 | End: 2022-06-19

## 2022-03-21 NOTE — TELEPHONE ENCOUNTER
Thank you for letting me know.  I put it in his notes but did not send the med.  I have sent it now.  I have signed for the following orders AND/OR meds.  Please call the patient and ask the patient to schedule the testing AND/OR inform about any medications that were sent.      No orders of the defined types were placed in this encounter.      Medications Ordered This Encounter   Medications    terbinafine HCL (LAMISIL) 250 mg tablet     Sig: Take 1 tablet (250 mg total) by mouth once daily.     Dispense:  90 tablet     Refill:  0

## 2022-03-21 NOTE — TELEPHONE ENCOUNTER
----- Message from Kat Higuera sent at 3/21/2022  4:44 PM CDT -----  .Type:  Needs Medical Advice    Who Called: CONCEPCION ARORA [[id number]]  Symptoms (please be specific):   How long has patient had these symptoms:      Pharmacy name and phone #:     [id number] - [facility] [address] AT [facility] [address] [facility] [address] [place]  [address] [facility] [address]  Phone: [phone] Fax: [phone]    Would the patient rather a call back or a response via [facility]?  Call    Best Call Back Number:  [phone]  Additional Information: Pt is requesting a call from office regarding status of medication for left big toe

## 2022-03-21 NOTE — PROGRESS NOTES
Subjective:      Patient ID: Wilbert Luna is a 71 y.o. male.    Chief Complaint: Annual Exam    Problem List Items Addressed This Visit     Polymyalgia rheumatica    Overview     He has PMR and is followed by Dr. Casper. He has improved and he is now on controlled meds with plaquenil.  He has done better with this.             Relevant Orders    CBC Auto Differential    Rib pain on right side    Overview     He fell across a log 3 weeks ago and has pain in the right lower lateral ribs.  He had problems with his breathing for a while but that is better.  He still can't lay on his right side.  No cough or hemoptysis.           Relevant Orders    X-Ray Ribs 2 View Right    Urinalysis, Reflex to Urine Culture Urine, Clean Catch      Other Visit Diagnoses     Onychomycosis    -  Primary    Encounter for lipid screening for cardiovascular disease        Relevant Orders    Lipid Panel    Screening for diabetes mellitus        Relevant Orders    Comprehensive Metabolic Panel    Encounter for prostate cancer screening        Relevant Orders    PSA, Screening    Immunization due              The patient's Health Maintenance was reviewed and the following appears to be due:   Health Maintenance Due   Topic Date Due    Shingles Vaccine (1 of 2) Never done    Pneumococcal Vaccines (Age 65+) (2 of 2 - PPSV23) 04/29/2020    PROSTATE-SPECIFIC ANTIGEN  04/30/2020       Past Medical History:  Past Medical History:   Diagnosis Date    Cancer skin cancer on head     Past Surgical History:   Procedure Laterality Date    COLONOSCOPY N/A 6/5/2019    Procedure: COLONOSCOPY;  Surgeon: Kaiden Gallagher III, MD;  Location: [facility];  Service: Endoscopy;  Laterality: N/A;    HERNIA REPAIR      SPINE SURGERY       Review of patient's allergies indicates:  No Known Allergies  Current Outpatient Medications on File Prior to Visit   Medication Sig Dispense Refill    esomeprazole magnesium (NEXIUM) 10 mg suspension Take 10 mg by  mouth before breakfast.      fluticasone propionate (FLONASE) 50 mcg/actuation nasal spray 2 sprays by Each Nostril route once daily.      hydrOXYchloroQUINE (PLAQUENIL) 200 mg tablet Take 1 tablet (200 mg total) by mouth 2 (two) times daily. 180 tablet 1    mupirocin (BACTROBAN) 2 % ointment RAFA TO NOSE BID FOR 7 DAYS      vitamin D (VITAMIN D3) 1000 units Tab Take 1,000 Units by mouth once daily.       No current facility-administered medications on file prior to visit.     Social History     Socioeconomic History    Marital status:    Tobacco Use    Smoking status: Never Smoker    Smokeless tobacco: Never Used   Substance and Sexual Activity    Alcohol use: No    Drug use: No    Sexual activity: Yes     Partners: Female     Family History   Problem Relation Age of Onset    Hypertension Mother     Cancer Father     Hypertension Maternal Grandmother     Thyroid disease Maternal Grandmother     Amblyopia Neg Hx     Blindness Neg Hx     Cataracts Neg Hx     Diabetes Neg Hx     Glaucoma Neg Hx     Macular degeneration Neg Hx     Retinal detachment Neg Hx     Strabismus Neg Hx     Stroke Neg Hx        Review of Systems   Constitutional: Negative.  Negative for chills, diaphoresis and fever.   HENT: Negative for congestion, hearing loss, mouth sores, postnasal drip and sore throat.    Eyes: Negative for pain and visual disturbance.   Respiratory: Negative for cough, chest tightness, shortness of breath and wheezing.    Cardiovascular: Positive for chest pain.   Gastrointestinal: Negative for abdominal pain, anal bleeding, blood in stool, constipation, diarrhea, nausea and vomiting.   Genitourinary: Negative for dysuria and hematuria.   Musculoskeletal: Negative for back pain, neck pain and neck stiffness.   Skin: Negative for rash.   Neurological: Negative for dizziness and weakness.       Objective:   /80 (BP Location: Left arm, Patient Position: Sitting, BP Method: Medium  (Automatic))   Pulse (!) 50   Temp 98.1 °F (36.7 °C)   Resp 18   Ht 6' (1.829 m)   Wt 93.4 kg (206 lb)   BMI 27.94 kg/m²     Physical Exam  Constitutional:       Appearance: He is well-developed. He is not diaphoretic.   HENT:      Head: Normocephalic and atraumatic.      Right Ear: External ear normal.      Left Ear: External ear normal.      Nose: Nose normal.      Mouth/Throat:      Pharynx: No oropharyngeal exudate.   Eyes:      General: No scleral icterus.        Right eye: No discharge.         Left eye: No discharge.      Conjunctiva/sclera: Conjunctivae normal.      Pupils: Pupils are equal, round, and reactive to light.   Neck:      Thyroid: No thyromegaly.      Vascular: No JVD.   Cardiovascular:      Rate and Rhythm: Normal rate and regular rhythm.      Heart sounds: Normal heart sounds. No murmur heard.    No friction rub. No gallop.   Pulmonary:      Effort: Pulmonary effort is normal. No respiratory distress.      Breath sounds: Normal breath sounds. No wheezing or rales.   Chest:      Chest wall: No tenderness.   Abdominal:      General: Bowel sounds are normal. There is no distension.      Palpations: Abdomen is soft. There is no mass.      Tenderness: There is no abdominal tenderness. There is no guarding or rebound.   Musculoskeletal:         General: No tenderness. Normal range of motion.      Cervical back: Normal range of motion and neck supple.   Lymphadenopathy:      Cervical: No cervical adenopathy.   Skin:     General: Skin is warm and dry.   Neurological:      Mental Status: He is alert and oriented to person, place, and time.      Cranial Nerves: No cranial nerve deficit.      Coordination: Coordination normal.     he has fungal changes of the left great toe.  Assessment:     1. Onychomycosis    2. Polymyalgia rheumatica    3. Rib pain on right side    4. Encounter for lipid screening for cardiovascular disease    5. Screening for diabetes mellitus    6. Encounter for prostate cancer  screening    7. Immunization due      Plan:   I am having Wilbert Luna maintain his fluticasone propionate, mupirocin, esomeprazole magnesium, vitamin D, and hydrOXYchloroQUINE.  Problem List Items Addressed This Visit     Polymyalgia rheumatica    Relevant Orders    CBC Auto Differential    Rib pain on right side    Relevant Orders    X-Ray Ribs 2 View Right    Urinalysis, Reflex to Urine Culture Urine, Clean Catch      Other Visit Diagnoses     Onychomycosis    -  Primary    Encounter for lipid screening for cardiovascular disease        Relevant Orders    Lipid Panel    Screening for diabetes mellitus        Relevant Orders    Comprehensive Metabolic Panel    Encounter for prostate cancer screening        Relevant Orders    PSA, Screening    Immunization due            Follow up for Draw labs now, Arrange imaging ordered today, Arrange immunization(s) ordered.    Wilbert was seen today for annual exam.    Diagnoses and all orders for this visit:    Onychomycosis    Polymyalgia rheumatica  -     CBC Auto Differential; Future    Rib pain on right side  -     X-Ray Ribs 2 View Right; Future  -     Urinalysis, Reflex to Urine Culture Urine, Clean Catch    Encounter for lipid screening for cardiovascular disease  -     Lipid Panel; Future    Screening for diabetes mellitus  -     Comprehensive Metabolic Panel; Future    Encounter for prostate cancer screening  -     PSA, Screening; Future    Immunization due    Other orders  -     Pneumococcal Polysaccharide Vaccine (23 Valent) (SQ/IM)         The patient was instructed to stop the following meds:  There are no discontinued medications.  Orders Placed This Encounter   Procedures    X-Ray Ribs 2 View Right     Standing Status:   Future     Standing Expiration Date:   3/21/2023     Order Specific Question:   Reason for Exam:     Answer:   pain    Pneumococcal Polysaccharide Vaccine (23 Valent) (SQ/IM)     Administer a Pneumococcal 23 vaccine to the patient.    CBC  Auto Differential     Standing Status:   Future     Standing Expiration Date:   5/20/2023    Comprehensive Metabolic Panel     Standing Status:   Future     Standing Expiration Date:   3/21/2023    Lipid Panel     Standing Status:   Future     Standing Expiration Date:   3/21/2023    PSA, Screening     Standing Status:   Future     Standing Expiration Date:   3/22/2023    Urinalysis, Reflex to Urine Culture Urine, Clean Catch     Order Specific Question:   Preferred Collection Type     Answer:   Urine, Clean Catch     Order Specific Question:   Specimen Source     Answer:   Urine       Medication List with Changes/Refills   Current Medications    ESOMEPRAZOLE MAGNESIUM (NEXIUM) 10 MG SUSPENSION    Take 10 mg by mouth before breakfast.    FLUTICASONE PROPIONATE (FLONASE) 50 MCG/ACTUATION NASAL SPRAY    2 sprays by Each Nostril route once daily.    HYDROXYCHLOROQUINE (PLAQUENIL) 200 MG TABLET    Take 1 tablet (200 mg total) by mouth 2 (two) times daily.    MUPIROCIN (BACTROBAN) 2 % OINTMENT    RAFA TO NOSE BID FOR 7 DAYS    VITAMIN D (VITAMIN D3) 1000 UNITS TAB    Take 1,000 Units by mouth once daily.      Medication List with Changes/Refills   Current Medications    ESOMEPRAZOLE MAGNESIUM (NEXIUM) 10 MG SUSPENSION    Take 10 mg by mouth before breakfast.       Start Date: --        End Date: --    FLUTICASONE PROPIONATE (FLONASE) 50 MCG/ACTUATION NASAL SPRAY    2 sprays by Each Nostril route once daily.       Start Date: 7/9/2020  End Date: --    HYDROXYCHLOROQUINE (PLAQUENIL) 200 MG TABLET    Take 1 tablet (200 mg total) by mouth 2 (two) times daily.       Start Date: 2/24/2022 End Date: 5/25/2022    MUPIROCIN (BACTROBAN) 2 % OINTMENT    RAFA TO NOSE BID FOR 7 DAYS       Start Date: 9/2/2020  End Date: --    VITAMIN D (VITAMIN D3) 1000 UNITS TAB    Take 1,000 Units by mouth once daily.       Start Date: --        End Date: --        Start the following for the onychomycosis.      Medications Ordered This Encounter    Medications    terbinafine HCL (LAMISIL) 250 mg tablet     Sig: Take 1 tablet (250 mg total) by mouth once daily.     Dispense:  90 tablet     Refill:  0

## 2022-05-26 ENCOUNTER — PATIENT MESSAGE (OUTPATIENT)
Dept: FAMILY MEDICINE | Facility: CLINIC | Age: 72
End: 2022-05-26
Payer: MEDICARE

## 2022-07-08 ENCOUNTER — PATIENT MESSAGE (OUTPATIENT)
Dept: FAMILY MEDICINE | Facility: CLINIC | Age: 72
End: 2022-07-08
Payer: MEDICARE

## 2022-08-31 ENCOUNTER — LAB VISIT (OUTPATIENT)
Dept: LAB | Facility: HOSPITAL | Age: 72
End: 2022-08-31
Attending: INTERNAL MEDICINE
Payer: MEDICARE

## 2022-08-31 DIAGNOSIS — M35.3 POLYMYALGIA RHEUMATICA SYNDROME: ICD-10-CM

## 2022-08-31 LAB
ALBUMIN SERPL BCP-MCNC: 4 G/DL (ref 3.5–5.2)
ALP SERPL-CCNC: 75 U/L (ref 55–135)
ALT SERPL W/O P-5'-P-CCNC: 13 U/L (ref 10–44)
ANION GAP SERPL CALC-SCNC: 7 MMOL/L (ref 8–16)
AST SERPL-CCNC: 21 U/L (ref 10–40)
BASOPHILS # BLD AUTO: 0.06 K/UL (ref 0–0.2)
BASOPHILS NFR BLD: 0.9 % (ref 0–1.9)
BILIRUB SERPL-MCNC: 0.7 MG/DL (ref 0.1–1)
BUN SERPL-MCNC: 17 MG/DL (ref 8–23)
CALCIUM SERPL-MCNC: 9.7 MG/DL (ref 8.7–10.5)
CHLORIDE SERPL-SCNC: 104 MMOL/L (ref 95–110)
CO2 SERPL-SCNC: 29 MMOL/L (ref 23–29)
CREAT SERPL-MCNC: 1.2 MG/DL (ref 0.5–1.4)
CRP SERPL-MCNC: 3.5 MG/L (ref 0–8.2)
DIFFERENTIAL METHOD: NORMAL
EOSINOPHIL # BLD AUTO: 0.2 K/UL (ref 0–0.5)
EOSINOPHIL NFR BLD: 3 % (ref 0–8)
ERYTHROCYTE [DISTWIDTH] IN BLOOD BY AUTOMATED COUNT: 12.9 % (ref 11.5–14.5)
ERYTHROCYTE [SEDIMENTATION RATE] IN BLOOD BY WESTERGREN METHOD: 2 MM/HR (ref 0–10)
EST. GFR  (NO RACE VARIABLE): >60 ML/MIN/1.73 M^2
GLUCOSE SERPL-MCNC: 88 MG/DL (ref 70–110)
HCT VFR BLD AUTO: 44.7 % (ref 40–54)
HGB BLD-MCNC: 15.1 G/DL (ref 14–18)
IMM GRANULOCYTES # BLD AUTO: 0.02 K/UL (ref 0–0.04)
IMM GRANULOCYTES NFR BLD AUTO: 0.3 % (ref 0–0.5)
LYMPHOCYTES # BLD AUTO: 2.3 K/UL (ref 1–4.8)
LYMPHOCYTES NFR BLD: 32.4 % (ref 18–48)
MCH RBC QN AUTO: 29.3 PG (ref 27–31)
MCHC RBC AUTO-ENTMCNC: 33.8 G/DL (ref 32–36)
MCV RBC AUTO: 87 FL (ref 82–98)
MONOCYTES # BLD AUTO: 0.6 K/UL (ref 0.3–1)
MONOCYTES NFR BLD: 8.4 % (ref 4–15)
NEUTROPHILS # BLD AUTO: 3.9 K/UL (ref 1.8–7.7)
NEUTROPHILS NFR BLD: 55.3 % (ref 38–73)
NRBC BLD-RTO: 0 /100 WBC
PLATELET # BLD AUTO: 213 K/UL (ref 150–450)
PMV BLD AUTO: 9.6 FL (ref 9.2–12.9)
POTASSIUM SERPL-SCNC: 5 MMOL/L (ref 3.5–5.1)
PROT SERPL-MCNC: 7.3 G/DL (ref 6–8.4)
RBC # BLD AUTO: 5.15 M/UL (ref 4.6–6.2)
RHEUMATOID FACT SERPL-ACNC: 13 IU/ML (ref 0–15)
SODIUM SERPL-SCNC: 140 MMOL/L (ref 136–145)
WBC # BLD AUTO: 7 K/UL (ref 3.9–12.7)

## 2022-08-31 PROCEDURE — 86431 RHEUMATOID FACTOR QUANT: CPT | Performed by: INTERNAL MEDICINE

## 2022-08-31 PROCEDURE — 80053 COMPREHEN METABOLIC PANEL: CPT | Performed by: INTERNAL MEDICINE

## 2022-08-31 PROCEDURE — 85651 RBC SED RATE NONAUTOMATED: CPT | Mod: PO | Performed by: INTERNAL MEDICINE

## 2022-08-31 PROCEDURE — 86140 C-REACTIVE PROTEIN: CPT | Performed by: INTERNAL MEDICINE

## 2022-08-31 PROCEDURE — 36415 COLL VENOUS BLD VENIPUNCTURE: CPT | Mod: PO | Performed by: INTERNAL MEDICINE

## 2022-08-31 PROCEDURE — 85025 COMPLETE CBC W/AUTO DIFF WBC: CPT | Mod: PO | Performed by: INTERNAL MEDICINE

## 2022-09-01 ENCOUNTER — OFFICE VISIT (OUTPATIENT)
Dept: FAMILY MEDICINE | Facility: CLINIC | Age: 72
End: 2022-09-01
Payer: MEDICARE

## 2022-09-01 VITALS
TEMPERATURE: 98 F | WEIGHT: 203.25 LBS | HEART RATE: 46 BPM | BODY MASS INDEX: 27.53 KG/M2 | HEIGHT: 72 IN | SYSTOLIC BLOOD PRESSURE: 139 MMHG | DIASTOLIC BLOOD PRESSURE: 74 MMHG

## 2022-09-01 DIAGNOSIS — J34.0 NASAL ABSCESS: Primary | ICD-10-CM

## 2022-09-01 PROCEDURE — 3078F DIAST BP <80 MM HG: CPT | Mod: CPTII,S$GLB,, | Performed by: NURSE PRACTITIONER

## 2022-09-01 PROCEDURE — 1126F AMNT PAIN NOTED NONE PRSNT: CPT | Mod: CPTII,S$GLB,, | Performed by: NURSE PRACTITIONER

## 2022-09-01 PROCEDURE — 1101F PR PT FALLS ASSESS DOC 0-1 FALLS W/OUT INJ PAST YR: ICD-10-PCS | Mod: CPTII,S$GLB,, | Performed by: NURSE PRACTITIONER

## 2022-09-01 PROCEDURE — 1159F PR MEDICATION LIST DOCUMENTED IN MEDICAL RECORD: ICD-10-PCS | Mod: CPTII,S$GLB,, | Performed by: NURSE PRACTITIONER

## 2022-09-01 PROCEDURE — 3008F PR BODY MASS INDEX (BMI) DOCUMENTED: ICD-10-PCS | Mod: CPTII,S$GLB,, | Performed by: NURSE PRACTITIONER

## 2022-09-01 PROCEDURE — 99213 PR OFFICE/OUTPT VISIT, EST, LEVL III, 20-29 MIN: ICD-10-PCS | Mod: S$GLB,,, | Performed by: NURSE PRACTITIONER

## 2022-09-01 PROCEDURE — 3075F PR MOST RECENT SYSTOLIC BLOOD PRESS GE 130-139MM HG: ICD-10-PCS | Mod: CPTII,S$GLB,, | Performed by: NURSE PRACTITIONER

## 2022-09-01 PROCEDURE — 3008F BODY MASS INDEX DOCD: CPT | Mod: CPTII,S$GLB,, | Performed by: NURSE PRACTITIONER

## 2022-09-01 PROCEDURE — 1160F PR REVIEW ALL MEDS BY PRESCRIBER/CLIN PHARMACIST DOCUMENTED: ICD-10-PCS | Mod: CPTII,S$GLB,, | Performed by: NURSE PRACTITIONER

## 2022-09-01 PROCEDURE — 99213 OFFICE O/P EST LOW 20 MIN: CPT | Mod: S$GLB,,, | Performed by: NURSE PRACTITIONER

## 2022-09-01 PROCEDURE — 3075F SYST BP GE 130 - 139MM HG: CPT | Mod: CPTII,S$GLB,, | Performed by: NURSE PRACTITIONER

## 2022-09-01 PROCEDURE — 1160F RVW MEDS BY RX/DR IN RCRD: CPT | Mod: CPTII,S$GLB,, | Performed by: NURSE PRACTITIONER

## 2022-09-01 PROCEDURE — 1159F MED LIST DOCD IN RCRD: CPT | Mod: CPTII,S$GLB,, | Performed by: NURSE PRACTITIONER

## 2022-09-01 PROCEDURE — 1157F ADVNC CARE PLAN IN RCRD: CPT | Mod: CPTII,S$GLB,, | Performed by: NURSE PRACTITIONER

## 2022-09-01 PROCEDURE — 99999 PR PBB SHADOW E&M-EST. PATIENT-LVL IV: ICD-10-PCS | Mod: PBBFAC,,, | Performed by: NURSE PRACTITIONER

## 2022-09-01 PROCEDURE — 3288F PR FALLS RISK ASSESSMENT DOCUMENTED: ICD-10-PCS | Mod: CPTII,S$GLB,, | Performed by: NURSE PRACTITIONER

## 2022-09-01 PROCEDURE — 99999 PR PBB SHADOW E&M-EST. PATIENT-LVL IV: CPT | Mod: PBBFAC,,, | Performed by: NURSE PRACTITIONER

## 2022-09-01 PROCEDURE — 1126F PR PAIN SEVERITY QUANTIFIED, NO PAIN PRESENT: ICD-10-PCS | Mod: CPTII,S$GLB,, | Performed by: NURSE PRACTITIONER

## 2022-09-01 PROCEDURE — 1101F PT FALLS ASSESS-DOCD LE1/YR: CPT | Mod: CPTII,S$GLB,, | Performed by: NURSE PRACTITIONER

## 2022-09-01 PROCEDURE — 1157F PR ADVANCE CARE PLAN OR EQUIV PRESENT IN MEDICAL RECORD: ICD-10-PCS | Mod: CPTII,S$GLB,, | Performed by: NURSE PRACTITIONER

## 2022-09-01 PROCEDURE — 3288F FALL RISK ASSESSMENT DOCD: CPT | Mod: CPTII,S$GLB,, | Performed by: NURSE PRACTITIONER

## 2022-09-01 PROCEDURE — 3078F PR MOST RECENT DIASTOLIC BLOOD PRESSURE < 80 MM HG: ICD-10-PCS | Mod: CPTII,S$GLB,, | Performed by: NURSE PRACTITIONER

## 2022-09-01 RX ORDER — MUPIROCIN 20 MG/G
OINTMENT TOPICAL 3 TIMES DAILY
Qty: 22 G | Refills: 0 | Status: SHIPPED | OUTPATIENT
Start: 2022-09-01 | End: 2022-09-01

## 2022-09-01 RX ORDER — MUPIROCIN 20 MG/G
OINTMENT TOPICAL 3 TIMES DAILY
Qty: 22 G | Refills: 0 | Status: SHIPPED | OUTPATIENT
Start: 2022-09-01 | End: 2022-09-11

## 2022-09-01 RX ORDER — DOXYCYCLINE HYCLATE 100 MG
100 TABLET ORAL 2 TIMES DAILY
Qty: 20 TABLET | Refills: 0 | Status: SHIPPED | OUTPATIENT
Start: 2022-09-01 | End: 2022-09-11

## 2022-09-01 NOTE — PATIENT INSTRUCTIONS
Avoid prolonged sun exposure while taking doxycycline  Report to ER immediately if symptoms worsen or persist

## 2022-09-01 NOTE — PROGRESS NOTES
Subjective:       Patient ID: Wilbert Luna is a 71 y.o. male.    Chief Complaint: Nose Problem (Sore inside of nose. 1.5 weeks)    Cyst  This is a new (right inner nostril) problem. The current episode started 1 to 4 weeks ago. The problem occurs constantly. The problem has been unchanged. Pertinent negatives include no abdominal pain, anorexia, arthralgias, change in bowel habit, chest pain, chills, congestion, coughing, diaphoresis, fatigue, fever, headaches, joint swelling, myalgias, nausea, neck pain, numbness, rash, sore throat, swollen glands, urinary symptoms, vertigo, visual change, vomiting or weakness. Nothing aggravates the symptoms. He has tried nothing for the symptoms. The treatment provided no relief. Review of Systems   Constitutional: Negative.  Negative for chills, diaphoresis, fatigue and fever.   HENT:  Negative for nasal congestion and sore throat.         Painful cyst to right nostril   Eyes: Negative.    Respiratory: Negative.  Negative for cough.    Cardiovascular: Negative.  Negative for chest pain.   Gastrointestinal: Negative.  Negative for abdominal pain, anorexia, change in bowel habit, nausea, vomiting and change in bowel habit.   Endocrine: Negative.    Genitourinary: Negative.    Musculoskeletal: Negative.  Negative for arthralgias, joint swelling, myalgias and neck pain.   Integumentary:  Negative for rash. Negative.   Allergic/Immunologic: Negative.    Neurological: Negative.  Negative for vertigo, weakness, numbness and headaches.   Psychiatric/Behavioral: Negative.     Past Medical History:   Diagnosis Date    Cancer skin cancer on head     Social History     Socioeconomic History    Marital status:    Tobacco Use    Smoking status: Never    Smokeless tobacco: Never   Substance and Sexual Activity    Alcohol use: No    Drug use: No    Sexual activity: Yes     Partners: Female     Past Surgical History:   Procedure Laterality Date    COLONOSCOPY N/A 6/5/2019     Procedure: COLONOSCOPY;  Surgeon: Kaiden Gallagher III, MD;  Location: [facility];  Service: Endoscopy;  Laterality: N/A;    HERNIA REPAIR      SPINE SURGERY          Objective:      Physical Exam  Vitals and nursing note reviewed.   Constitutional:       Appearance: Normal appearance.   HENT:      Head: Normocephalic.      Right Ear: Hearing, tympanic membrane, ear canal and external ear normal.      Left Ear: Hearing, tympanic membrane, ear canal and external ear normal.      Nose: Nasal tenderness (Nodule noted to right inner nostril) present. No nasal deformity, septal deviation, signs of injury, laceration, mucosal edema or congestion.      Mouth/Throat:      Mouth: Mucous membranes are moist.      Pharynx: Oropharynx is clear.   Eyes:      Conjunctiva/sclera: Conjunctivae normal.      Pupils: Pupils are equal, round, and reactive to light.   Cardiovascular:      Rate and Rhythm: Normal rate and regular rhythm.      Pulses: Normal pulses.      Heart sounds: Normal heart sounds.   Pulmonary:      Effort: Pulmonary effort is normal.      Breath sounds: Normal breath sounds.   Abdominal:      General: Bowel sounds are normal.      Palpations: Abdomen is soft.   Musculoskeletal:         General: Normal range of motion.      Cervical back: Normal range of motion and neck supple.   Skin:     General: Skin is warm and dry.      Capillary Refill: Capillary refill takes 2 to 3 seconds.   Neurological:      Mental Status: He is alert and oriented to person, place, and time.   Psychiatric:         Mood and Affect: Mood normal.         Behavior: Behavior normal.         Thought Content: Thought content normal.         Judgment: Judgment normal.       Assessment:       Problem List Items Addressed This Visit    None  Visit Diagnoses       Nasal abscess    -  Primary    Relevant Orders    Ambulatory referral/consult to ENT              Plan:           Wilbert was seen today for nose problem.    Diagnoses and all orders for  this visit:    Nasal abscess  -     Ambulatory referral/consult to ENT; Future        -     doxycycline (VIBRA-TABS) 100 MG tablet; Take 1 tablet (100 mg total) by mouth 2 (two) times daily. for 10 days  -     mupirocin (BACTROBAN) 2 % ointment; by Nasal route 3 (three) times daily. for 10 days  Report to ER immediately if symptoms worsen or persist

## 2022-09-07 ENCOUNTER — TELEPHONE (OUTPATIENT)
Dept: FAMILY MEDICINE | Facility: CLINIC | Age: 72
End: 2022-09-07
Payer: MEDICARE

## 2022-09-07 NOTE — TELEPHONE ENCOUNTER
Please confirm pt obtained doxycycline; received communication from pharmacy doxycycline hyc not covered, however also states same med as preferred alternative (doxycycline hyclate).

## 2022-09-25 PROBLEM — Z71.89 ACP (ADVANCE CARE PLANNING): Status: ACTIVE | Noted: 2022-09-25

## 2022-09-25 PROBLEM — E87.6 HYPOKALEMIA: Status: ACTIVE | Noted: 2022-09-25

## 2022-09-25 PROBLEM — R29.898 BILATERAL LEG WEAKNESS: Status: ACTIVE | Noted: 2022-09-25

## 2022-09-26 ENCOUNTER — TELEPHONE (OUTPATIENT)
Dept: FAMILY MEDICINE | Facility: CLINIC | Age: 72
End: 2022-09-26
Payer: MEDICARE

## 2022-09-26 PROBLEM — G61.0 GUILLAIN BARRÉ SYNDROME: Status: ACTIVE | Noted: 2022-09-25

## 2022-09-26 PROBLEM — R06.03 RESPIRATORY DISTRESS: Status: ACTIVE | Noted: 2022-09-26

## 2022-09-26 PROBLEM — R29.898 BILATERAL LEG WEAKNESS: Status: ACTIVE | Noted: 2022-09-26

## 2022-09-26 NOTE — TELEPHONE ENCOUNTER
Patients daughter Camilo called stating patient is in the hospital at [facility] and the family is wanting you to be aware of everything going on. At this time they are thinking Guillain- [place] or Lyme Disease.

## 2022-09-27 PROBLEM — R13.10 DYSPHAGIA: Status: ACTIVE | Noted: 2022-09-27

## 2022-09-29 PROBLEM — E87.1 HYPONATREMIA: Status: ACTIVE | Noted: 2022-09-29

## 2022-09-29 PROBLEM — I10 ESSENTIAL HYPERTENSION: Status: ACTIVE | Noted: 2022-09-29

## 2022-09-30 PROBLEM — J96.00 ACUTE NEUROMUSCULAR RESPIRATORY FAILURE: Status: ACTIVE | Noted: 2022-09-30

## 2022-09-30 PROBLEM — D72.829 LEUKOCYTOSIS: Status: ACTIVE | Noted: 2022-09-30

## 2022-09-30 PROBLEM — J96.90 ACUTE NEUROMUSCULAR RESPIRATORY FAILURE: Status: ACTIVE | Noted: 2022-09-30

## 2022-09-30 PROBLEM — G70.9 ACUTE NEUROMUSCULAR RESPIRATORY FAILURE: Status: ACTIVE | Noted: 2022-09-30

## 2022-10-03 PROBLEM — J96.02 ACUTE RESPIRATORY FAILURE WITH HYPERCAPNIA: Status: ACTIVE | Noted: 2022-10-03

## 2022-10-03 PROBLEM — Z99.11 ON MECHANICALLY ASSISTED VENTILATION: Status: ACTIVE | Noted: 2022-10-03

## 2022-10-04 PROBLEM — R74.01 TRANSAMINITIS: Status: ACTIVE | Noted: 2022-10-04

## 2022-10-05 PROBLEM — K59.09 OTHER CONSTIPATION: Status: ACTIVE | Noted: 2022-10-05

## 2022-10-09 PROBLEM — G93.40 ACUTE ENCEPHALOPATHY: Status: ACTIVE | Noted: 2022-10-09

## 2022-10-09 PROBLEM — J15.0: Status: ACTIVE | Noted: 2022-10-09

## 2022-10-09 PROBLEM — N17.9 AKI (ACUTE KIDNEY INJURY): Status: ACTIVE | Noted: 2022-10-09

## 2022-10-14 PROBLEM — J15.0: Status: RESOLVED | Noted: 2022-10-09 | Resolved: 2022-10-14

## 2022-10-18 PROBLEM — Z75.8 DISCHARGE PLANNING ISSUES: Status: ACTIVE | Noted: 2022-10-18

## 2022-10-20 PROBLEM — J34.89 NASAL PAIN: Status: ACTIVE | Noted: 2022-10-20

## 2022-10-26 ENCOUNTER — HOSPITAL ENCOUNTER (OUTPATIENT)
Dept: RADIOLOGY | Facility: HOSPITAL | Age: 72
Discharge: HOME | End: 2022-10-26
Attending: PHYSICAL MEDICINE & REHABILITATION
Payer: MEDICARE

## 2022-10-26 PROCEDURE — 74018 XR KUB: ICD-10-PCS | Mod: 26,,, | Performed by: RADIOLOGY

## 2022-10-26 PROCEDURE — 74018 RADEX ABDOMEN 1 VIEW: CPT | Mod: 26,,, | Performed by: RADIOLOGY

## 2022-10-31 ENCOUNTER — HOSPITAL ENCOUNTER (OUTPATIENT)
Dept: RADIOLOGY | Facility: HOSPITAL | Age: 72
Discharge: HOME | End: 2022-10-31
Attending: PHYSICAL MEDICINE & REHABILITATION
Payer: MEDICARE

## 2022-10-31 PROCEDURE — 74018 XR KUB: ICD-10-PCS | Mod: 26,,, | Performed by: RADIOLOGY

## 2022-10-31 PROCEDURE — 74018 RADEX ABDOMEN 1 VIEW: CPT | Mod: 26,,, | Performed by: RADIOLOGY

## 2022-11-11 ENCOUNTER — PATIENT MESSAGE (OUTPATIENT)
Dept: OTOLARYNGOLOGY | Facility: CLINIC | Age: 72
End: 2022-11-11
Payer: MEDICARE

## 2022-12-07 ENCOUNTER — HOSPITAL ENCOUNTER (OUTPATIENT)
Dept: RADIOLOGY | Facility: HOSPITAL | Age: 72
Discharge: HOME | End: 2022-12-07
Attending: FAMILY MEDICINE
Payer: MEDICARE

## 2022-12-07 ENCOUNTER — OFFICE VISIT (OUTPATIENT)
Dept: FAMILY MEDICINE | Facility: CLINIC | Age: 72
End: 2022-12-07
Payer: MEDICARE

## 2022-12-07 VITALS
HEART RATE: 97 BPM | OXYGEN SATURATION: 99 % | DIASTOLIC BLOOD PRESSURE: 69 MMHG | SYSTOLIC BLOOD PRESSURE: 98 MMHG | BODY MASS INDEX: 24.41 KG/M2 | HEIGHT: 72 IN | RESPIRATION RATE: 18 BRPM | TEMPERATURE: 99 F | WEIGHT: 180.25 LBS

## 2022-12-07 DIAGNOSIS — E87.1 HYPONATREMIA: ICD-10-CM

## 2022-12-07 DIAGNOSIS — N17.9 ACUTE KIDNEY INJURY: ICD-10-CM

## 2022-12-07 DIAGNOSIS — Z87.01 HISTORY OF PNEUMONIA: ICD-10-CM

## 2022-12-07 DIAGNOSIS — I10 HYPERTENSION, UNSPECIFIED TYPE: ICD-10-CM

## 2022-12-07 DIAGNOSIS — E87.6 HYPOKALEMIA: ICD-10-CM

## 2022-12-07 DIAGNOSIS — R74.01 TRANSAMINITIS: ICD-10-CM

## 2022-12-07 DIAGNOSIS — G61.0 GUILLAIN BARRÉ SYNDROME: Primary | ICD-10-CM

## 2022-12-07 DIAGNOSIS — M35.3 POLYMYALGIA RHEUMATICA: ICD-10-CM

## 2022-12-07 PROCEDURE — 71046 X-RAY EXAM CHEST 2 VIEWS: CPT | Mod: TC,PO

## 2022-12-07 PROCEDURE — 3078F DIAST BP <80 MM HG: CPT | Mod: CPTII,S$GLB,, | Performed by: FAMILY MEDICINE

## 2022-12-07 PROCEDURE — 1101F PR PT FALLS ASSESS DOC 0-1 FALLS W/OUT INJ PAST YR: ICD-10-PCS | Mod: CPTII,S$GLB,, | Performed by: FAMILY MEDICINE

## 2022-12-07 PROCEDURE — 3062F PR POS MACROALBUMINURIA RESULT DOCUMENTED/REVIEW: ICD-10-PCS | Mod: CPTII,S$GLB,, | Performed by: FAMILY MEDICINE

## 2022-12-07 PROCEDURE — 3008F PR BODY MASS INDEX (BMI) DOCUMENTED: ICD-10-PCS | Mod: CPTII,S$GLB,, | Performed by: FAMILY MEDICINE

## 2022-12-07 PROCEDURE — 3288F PR FALLS RISK ASSESSMENT DOCUMENTED: ICD-10-PCS | Mod: CPTII,S$GLB,, | Performed by: FAMILY MEDICINE

## 2022-12-07 PROCEDURE — 1159F PR MEDICATION LIST DOCUMENTED IN MEDICAL RECORD: ICD-10-PCS | Mod: CPTII,S$GLB,, | Performed by: FAMILY MEDICINE

## 2022-12-07 PROCEDURE — 3008F BODY MASS INDEX DOCD: CPT | Mod: CPTII,S$GLB,, | Performed by: FAMILY MEDICINE

## 2022-12-07 PROCEDURE — 1126F AMNT PAIN NOTED NONE PRSNT: CPT | Mod: CPTII,S$GLB,, | Performed by: FAMILY MEDICINE

## 2022-12-07 PROCEDURE — 1159F MED LIST DOCD IN RCRD: CPT | Mod: CPTII,S$GLB,, | Performed by: FAMILY MEDICINE

## 2022-12-07 PROCEDURE — 3044F HG A1C LEVEL LT 7.0%: CPT | Mod: CPTII,S$GLB,, | Performed by: FAMILY MEDICINE

## 2022-12-07 PROCEDURE — 3288F FALL RISK ASSESSMENT DOCD: CPT | Mod: CPTII,S$GLB,, | Performed by: FAMILY MEDICINE

## 2022-12-07 PROCEDURE — 3074F SYST BP LT 130 MM HG: CPT | Mod: CPTII,S$GLB,, | Performed by: FAMILY MEDICINE

## 2022-12-07 PROCEDURE — 3062F POS MACROALBUMINURIA REV: CPT | Mod: CPTII,S$GLB,, | Performed by: FAMILY MEDICINE

## 2022-12-07 PROCEDURE — 3066F PR DOCUMENTATION OF TREATMENT FOR NEPHROPATHY: ICD-10-PCS | Mod: CPTII,S$GLB,, | Performed by: FAMILY MEDICINE

## 2022-12-07 PROCEDURE — 3074F PR MOST RECENT SYSTOLIC BLOOD PRESSURE < 130 MM HG: ICD-10-PCS | Mod: CPTII,S$GLB,, | Performed by: FAMILY MEDICINE

## 2022-12-07 PROCEDURE — 99214 PR OFFICE/OUTPT VISIT, EST, LEVL IV, 30-39 MIN: ICD-10-PCS | Mod: S$GLB,,, | Performed by: FAMILY MEDICINE

## 2022-12-07 PROCEDURE — 1157F PR ADVANCE CARE PLAN OR EQUIV PRESENT IN MEDICAL RECORD: ICD-10-PCS | Mod: CPTII,S$GLB,, | Performed by: FAMILY MEDICINE

## 2022-12-07 PROCEDURE — 1101F PT FALLS ASSESS-DOCD LE1/YR: CPT | Mod: CPTII,S$GLB,, | Performed by: FAMILY MEDICINE

## 2022-12-07 PROCEDURE — 71046 X-RAY EXAM CHEST 2 VIEWS: CPT | Mod: 26,,, | Performed by: RADIOLOGY

## 2022-12-07 PROCEDURE — 3044F PR MOST RECENT HEMOGLOBIN A1C LEVEL <7.0%: ICD-10-PCS | Mod: CPTII,S$GLB,, | Performed by: FAMILY MEDICINE

## 2022-12-07 PROCEDURE — 99999 PR PBB SHADOW E&M-EST. PATIENT-LVL IV: ICD-10-PCS | Mod: PBBFAC,,, | Performed by: FAMILY MEDICINE

## 2022-12-07 PROCEDURE — 3066F NEPHROPATHY DOC TX: CPT | Mod: CPTII,S$GLB,, | Performed by: FAMILY MEDICINE

## 2022-12-07 PROCEDURE — 99214 OFFICE O/P EST MOD 30 MIN: CPT | Mod: S$GLB,,, | Performed by: FAMILY MEDICINE

## 2022-12-07 PROCEDURE — 71046 XR CHEST PA AND LATERAL: ICD-10-PCS | Mod: 26,,, | Performed by: RADIOLOGY

## 2022-12-07 PROCEDURE — 1157F ADVNC CARE PLAN IN RCRD: CPT | Mod: CPTII,S$GLB,, | Performed by: FAMILY MEDICINE

## 2022-12-07 PROCEDURE — 3078F PR MOST RECENT DIASTOLIC BLOOD PRESSURE < 80 MM HG: ICD-10-PCS | Mod: CPTII,S$GLB,, | Performed by: FAMILY MEDICINE

## 2022-12-07 PROCEDURE — 1126F PR PAIN SEVERITY QUANTIFIED, NO PAIN PRESENT: ICD-10-PCS | Mod: CPTII,S$GLB,, | Performed by: FAMILY MEDICINE

## 2022-12-07 PROCEDURE — 99999 PR PBB SHADOW E&M-EST. PATIENT-LVL IV: CPT | Mod: PBBFAC,,, | Performed by: FAMILY MEDICINE

## 2022-12-07 RX ORDER — HYDROGEN PEROXIDE 3 %
20 SOLUTION, NON-ORAL MISCELLANEOUS
COMMUNITY

## 2022-12-07 RX ORDER — AMITRIPTYLINE HYDROCHLORIDE 25 MG/1
25 TABLET, FILM COATED ORAL NIGHTLY
COMMUNITY
Start: 2022-11-15 | End: 2024-01-31

## 2022-12-07 RX ORDER — RIVAROXABAN 10 MG/1
10 TABLET, FILM COATED ORAL
Qty: 90 TABLET | Refills: 0 | Status: SHIPPED | OUTPATIENT
Start: 2022-12-07 | End: 2023-03-10

## 2022-12-07 RX ORDER — CHOLECALCIFEROL (VITAMIN D3) 50 MCG
1000 TABLET ORAL
COMMUNITY
End: 2024-01-31

## 2022-12-07 RX ORDER — HYDROCODONE BITARTRATE AND ACETAMINOPHEN 10; 325 MG/1; MG/1
1 TABLET ORAL EVERY 8 HOURS PRN
COMMUNITY
Start: 2022-11-15 | End: 2022-12-07 | Stop reason: SDUPTHER

## 2022-12-07 RX ORDER — POLYETHYLENE GLYCOL 3350 17 G/17G
17 POWDER, FOR SOLUTION ORAL
COMMUNITY
Start: 2022-11-15 | End: 2024-01-31

## 2022-12-07 RX ORDER — RIVAROXABAN 10 MG/1
10 TABLET, FILM COATED ORAL
COMMUNITY
Start: 2022-11-15 | End: 2022-12-07 | Stop reason: SDUPTHER

## 2022-12-07 RX ORDER — MELOXICAM 15 MG/1
TABLET ORAL
COMMUNITY
Start: 2022-11-16 | End: 2024-01-31

## 2022-12-07 RX ORDER — FLUTICASONE PROPIONATE 50 MCG
2 SPRAY, SUSPENSION (ML) NASAL
COMMUNITY

## 2022-12-07 RX ORDER — HYDROCODONE BITARTRATE AND ACETAMINOPHEN 10; 325 MG/1; MG/1
1 TABLET ORAL EVERY 8 HOURS PRN
Qty: 21 TABLET | Refills: 0 | Status: SHIPPED | OUTPATIENT
Start: 2022-12-07 | End: 2024-01-31

## 2022-12-07 RX ORDER — DICLOFENAC SODIUM 10 MG/G
2 GEL TOPICAL 4 TIMES DAILY
Qty: 100 G | Refills: 11 | Status: SHIPPED | OUTPATIENT
Start: 2022-12-07 | End: 2024-01-31

## 2022-12-07 NOTE — PROGRESS NOTES
Subjective:      Patient ID: Wilbert Luna is a 71 y.o. male.    Chief Complaint: Follow-up      The patient was recently hospitalized at [facility]  for Guilliame barre.  The discharge summary from the hospitalization is copied below for reference and completeness.      Transitional Care Note    Family and/or Caretaker present at visit?  Yes.  Diagnostic tests reviewed/disposition: No diagnosic tests pending after this hospitalization.  Disease/illness education: he understands what he had.  Home health/community services discussion/referrals: Patient does not have home health established from hospital visit.  They do not need home health.  If needed, we will set up home health for the patient.   Establishment or re-establishment of referral orders for community resources: No other necessary community resources.   Discussion with other health care providers: No discussion with other health care providers necessary.   Patient Care Team:  Sergio Quick MD as PCP - General (Family Medicine)  DISCHARGE SUMMARY:  Luis Fernando Griffin MD   Physician  [facility] Medicine  Discharge Summary     Signed  Creation Time:  10/21/2022  4:05 PM        [facility] Medicine  Discharge Summary        Patient Name: Wilbert Luna  MRN: [id number]  Patient Class: IP- Inpatient  Admission Date: 9/25/2022  Hospital Length of Stay: 26 days  Discharge Date and Time:  10/21/2022 4:05 PM  Attending Physician: Luis Fernando Griffin MD   Discharging Provider: Luis Fernando Griffin MD  Primary Care Provider: Sergio Quick MD        HPI:   Patient is a 71 year old male with past medical history of polymyalgia rheumatica presents with complaints of bilateral lower extremity and upper extremity weakness. He states that he drove down form [place] in a 20 hour drive. He states that he made very little stops. He states that he does not have any fever or chills. He denies any headaches or changes in vision. He states that more  than half way past his drive, he started to have the symptoms. He states that he has had some numbness in his legs as well. He states that when he initially got out of the car, he thought he was just stuff from sitting for a long time, but then the weakness was noted to get worse. Upon evaluation in the ED, patient underwent MRI-c-spine which showed mild cervical spinal stenosis and MRI-l-spine only showed degenerative changes. Neurology was consulted who recommended LP. Hospital medicine was consulted for further evaluation. Of note, patient states that he does occassionally go hunting and has had tick bites. However, he denies any recently.         Procedure(s) (LRB):  INSERTION, PEG TUBE (N/A)  EGD (ESOPHAGOGASTRODUODENOSCOPY) (N/A)       Hospital Course:   The patient was admitted to the neuro critical care service with complaints of bilateral lower and upper extremity weakness. Patient underwent MRI-c-spine and l-spine which did not show any acute findings. Neurology was consulted and recommended LP. LP findings were concerning for possible Guillian [place]. He was started on IVIG. He was electively intubated on the night of 9/29 due to respiratory fatigue. Patient was eventually extubated on 10/7. He required intermittent support with BiPAP. Patient had PEG tube placed on 10/12 as he had slow improvement in symptoms. Eventually, he started to increase his oral intake. He followed by speech who recommended regular diet. PT/OT were consulted and recommended inpatient rehab. Social workers were consulted. Patient was eventually discharged to inpatient rehab and likely will have a long recovery.         Goals of Care Treatment Preferences:  Code Status: DNR     Living Will: Yes  What is most important right now is to focus on improvement in condition but with limits to invasive therapies.  Accordingly, we have decided that the best plan to meet the patient's goals includes continuing with treatment.        Consults:    Consults (From admission, onward)            Status Ordering Provider       Inpatient consult to Social Work  Once        Provider:  (Not yet assigned)    Completed BRIAN BENNETT       Inpatient consult to Neurology  Once        Provider:  Ana Newton DO    Acknowledged JOHN PATEL       Inpatient consult to Gastroenterology  Once        Provider:  Macario Alexis MD    Acknowledged AYANA SANTILLAN       Inpatient consult to Social Work  Once        Provider:  (Not yet assigned)    Completed AYANA SANTILLAN       Inpatient consult to Registered Dietitian/Nutritionist  Once        Provider:  (Not yet assigned)    Completed NORA HAWK A.       Inpatient consult to PICC team ([facility])  Once        Provider:  (Not yet assigned)    Completed CARINE NORA A.       Inpatient consult to ENT  Once        Provider:  (Not yet assigned)    Completed NORA HAWK.       Inpatient consult to Ophthalmology  Once        Provider:  Anthony Freeman MD    Acknowledged NORA HAWK A.       Inpatient consult to Nephrology  Once        Provider:  Romario Alvarenga MD    Acknowledged JOHN PATEL       Inpatient consult to Registered Dietitian/Nutritionist  Once        Provider:  (Not yet assigned)    Completed JOHN PATEL.       Inpatient consult to ENT  Once        Provider:  Danny Luke MD    Completed JOHN PATEL.       Inpatient consult to Cardiology  Once        Provider:  Francisco Javier Sandoval MD    Completed JOHN PATEL       Inpatient consult to Infectious Diseases  Once        Provider:  Zoe Chaudhary MD    Completed JOHN PATEL       Inpatient consult to Neuro Critical Care  Once        Provider:  Claudia Figueroa MD    Completed RACHEL OQUENDO       Inpatient consult to Hospitalist  Once        Provider:  (Not yet assigned)    Acknowledged RACHEL OQUENDO       Inpatient consult to neurology  Once        Provider:  Chandan WADDELL  DO Karlos Hackett CHIRAG                No new Assessment & Plan notes have been filed under this hospital service since the last note was generated.  Service: Hospital Medicine            Final Active Diagnoses:     Diagnosis Date Noted POA    PRINCIPAL PROBLEM:  Guillain Barré syndrome [G61.0] 09/25/2022 Yes    Nasal pain [J34.89] 10/20/2022 Yes    Discharge planning issues [Z02.9] 10/18/2022 Not Applicable    SONIA (acute kidney injury) [N17.9] 10/09/2022 No    Acute encephalopathy [G93.40] 10/09/2022 No    Other constipation [K59.09] 10/05/2022 No    Transaminitis [R74.01] 10/04/2022 No    Acute respiratory failure with hypercapnia [J96.02] 10/03/2022 No    Acute neuromuscular respiratory failure [J96.90, G70.9] 09/30/2022 No    Essential hypertension [I10] 09/29/2022 Yes    Hyponatremia [E87.1] 09/29/2022 Yes    Dysphagia [R13.10] 09/27/2022 Yes    Bilateral leg weakness [R29.898] 09/26/2022 Yes    Respiratory distress [R06.03] 09/26/2022 Yes    Hypokalemia [E87.6] 09/25/2022 Yes    ACP (advance care planning) [Z71.89] 09/25/2022 Not Applicable    Polymyalgia rheumatica [M35.3] 03/21/2022 Yes       Problems Resolved During this Admission:     Diagnosis Date Noted Date Resolved POA    Pneumonia of lower lobe due to Klebsiella pneumoniae [J15.0] 10/09/2022 10/14/2022 No         Discharged Condition: stable     Disposition: Rehab Facility     Follow Up:    Follow-up Information         [facility] Transitional [facility] Follow up.    Specialty: Transitional Care  Why: Someone will contact you in 24-48 hours. Please answer a call from [phone].  Contact information:  [address], [address]  [facility] [address] [phone]                 Sergio Quick MD. Schedule an appointment as soon as possible for a visit.    Specialty: Family Medicine  Contact information:  [address] [facility] [place] [address] [phone]                    [facility]  Follow up.    Contact information:  [address], [address] Of [facility] [address]  [phone]                                Patient Instructions:             Ambulatory referral/consult to Transitional Care    Standing Status: Future   Referral Priority: Routine Referral Type: Consultation    Referral Reason: Specialty Services Required    Number of Visits Requested: 1       Diet Adult Regular      No dressing needed      Activity as tolerated         Significant Diagnostic Studies:   Imaging Results                  MRI Cervical Spine W WO Cont (Final result)  Result time 09/25/22 17:24:05            Final result by Travon Buchanan Jr., MD (09/25/22 17:24:05)                           Impression:        1. Advanced cervical degenerative disc disease.  Grade 1 retrolisthesis at C5-6 with mild central spinal canal stenosis.        Electronically signed by:     Travon Buchanan MD  Date:                                            09/25/2022  Time:                                            17:24                     Narrative:     EXAMINATION:  MRI CERVICAL SPINE W WO CONTRAST     CLINICAL HISTORY:  Myelopathy, acute, cervical spine;     TECHNIQUE:  Routine fat and water weighted sequences of the cervical spine were obtained.  In addition, axial and sagittal T1 postcontrast sequences were obtained.     20 cc of gadolinium base contrast was administered for the contrast enhanced portion of the exam.     COMPARISON:  None     FINDINGS:  The bone marrow signal intensity is heterogeneous with marrow replacement noted throughout the cervical spine.  There is a grade 1 retrolisthesis of C5 with respect to C6 measuring 5 mm, best identified on image 11 of series 16.  There is loss of cervical disc height with cervical disc desiccation at all levels throughout the cervical spine.  The cervical cord is normal in caliber.  The craniocervical junction is unremarkable.     At the C2-3 disc  level, the posterior disc contour is smooth.  No central spinal canal or foraminal stenosis.     At the C3-4 disc level, there is an asymmetric broad-based cervical disc bulge/disc osteophyte complex present which contacts the ventral aspect of the thecal sac.  No central spinal canal or foraminal stenosis.     At the C4-5 disc level, there is an asymmetric broad-based cervical disc bulge which flattens the ventral aspect of the cecal sac.  No central spinal canal or foraminal stenosis.     At the C5-C6 disc level, there is a broad-based cervical disc bulge which extends 4 mm from the endplate of C5 and contacts the ventral aspect of the cervical cord and displaces the cord posteriorly.  CSF is preserved dorsal to the cord at this level.  The disc also contacts the exiting bilateral C6 nerve root sheaths.  There is mild central spinal canal stenosis at this level.  Mild bilateral facet arthrosis is present.     At the C6-C7 disc level, there is a broad-based cervical disc bulge which contacts the ventral aspect of the thecal sac.  CSF is preserved dorsal to the disc at this level.  Central spinal canal and neural foramina are patent.     Following contrast administration, no enhancing lesions are noted within the spinal cord.  Motion artifact limits the exam.                                                MRI Lumbar Spine W WO Cont (Final result)  Result time 09/25/22 17:34:44            Final result by Travon Buchanan Jr., MD (09/25/22 17:34:44)                           Impression:        1. Lumbar degenerative disc disease most significant at L4-5 and L5-S1.  No central spinal canal or foraminal stenosis.  2. No abnormal enhancement following gadolinium delivery.        Electronically signed by:     Travon Buchanan MD  Date:                                            09/25/2022  Time:                                            17:34                     Narrative:     EXAMINATION:  MRI LUMBAR SPINE W WO  CONTRAST     CLINICAL HISTORY:  Low back pain, progressive neurologic deficit;     TECHNIQUE:  Routine fat and water weighted sequences of the lumbar spine were obtained.  In addition, axial and sagittal T1 postcontrast sequences were obtained.     20 cc of gadolinium base contrast was administered for the examination.     COMPARISON:  MRI of the lumbar spine 06/21/2014     FINDINGS:  The bone marrow signal intensity is heterogeneous with Modic endplate changes present.  Hemangiomas are noted within the L1 and S2 vertebral segments, similar in appearance to the previous exam.  There is lumbar disc desiccation at all levels throughout the lumbar spine most significant at L3-4, L4-5, and L5-S1, also similar in appearance to the previous exam.  The conus terminates at the L1 vertebral body level.     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  These are similar in appearance to the prior exam.     At the L1-2 disc level, the posterior disc contour is smooth.  The central spinal canal and neural foramina are patent.     At the L2-3 disc level, posterior disc contour is smooth.  Mild bilateral facet arthrosis is present.  No central spinal canal or foraminal stenosis.     At the L3-4 disc level, there is a broad-based lumbar disc bulge which flattens the ventral aspect of the thecal sac.  Mild bilateral facet arthrosis is present, right greater than left.  No central spinal canal or foraminal stenosis.     At the L4-5 disc level, there is an asymmetric broad-based lumbar disc bulge which flattens the ventral aspect of the thecal sac.  Mild bilateral facet arthrosis is present, right greater than left.  No central spinal canal or foraminal stenosis.     At the L5-S1 disc level, there is an asymmetric broad-based lumbar disc bulge.  Mild bilateral facet arthrosis is present.  No central spinal canal or foraminal stenosis.     Following intravenous contrast administration,  homogeneous enhancement throughout the renal parenchyma is noted.  Bilateral renal cysts are present.     There is no abnormal enhancement about the marrow space of the lumbar spine or surrounding the central spinal canal contents.                                                X-Ray Chest 1 View (Final result)  Result time 09/25/22 14:49:38            Final result by Elia Ocampo MD (09/25/22 14:49:38)                           Impression:        1. No evidence of acute cardiopulmonary abnormality.        Electronically signed by:     Elia Ocampo MD  Date:                                            09/25/2022  Time:                                            14:49                     Narrative:     EXAMINATION:  XR CHEST 1 VIEW     CLINICAL HISTORY:  Weakness     TECHNIQUE:  Single frontal view of the chest was performed.     COMPARISON:  Chest radiograph from 03/21/2022 and 09/19/2016     FINDINGS:  Heart size is normal.  There is slight elevation of the left hemidiaphragm.  There is no pulmonary edema.  Lung volumes are slightly low without focal consolidation, effusion or pneumothorax.                                                CT Head Without Contrast (Final result)  Result time 09/25/22 14:45:40            Final result by Travon Buchanan Jr., MD (09/25/22 14:45:40)                           Impression:        1. Remote ischemic changes with mild generalized convexity atrophy. No acute intracranial process.  No detrimental interval change from the previous exam.        Electronically signed by:     Travon Buchanan MD  Date:                                            09/25/2022  Time:                                            14:45                     Narrative:     EXAMINATION:  CT HEAD WITHOUT CONTRAST     CLINICAL HISTORY:  Transient ischemic attack (TIA);     TECHNIQUE:  Low dose axial CT images obtained throughout the head without intravenous contrast. Sagittal and coronal  reconstructions were performed.     RADIATION DOSE:  547 DLP.     Dose reduction software was utilized.     Iterative reconstruction was utilized. Automated exposure control technique.     COMPARISON:  09/17/2016     FINDINGS:  The brain parenchyma is normally formed without midline shift or mass effect. There is preservation of the gray-white matter differentiation without major vessel vascular distribution infarction. No acute intracranial hemorrhage.     There are areas of low attenuation about the periventricular white matter, basal ganglia, corona radiata, and brainstem, likely the sequela of microvascular ischemic change. There is beam hardening artifact about the calvarium.     The calvarium is otherwise intact. There is mucoperiosteal thickening of the paranasal sinuses.                                                  Pending Diagnostic Studies:         Procedure Component Value Units Date/Time     EKG 12-lead [[id number]]       Order Status: Sent Lab Status: No result       EKG 12-lead [[id number]] Collected: 10/19/22 0722     Order Status: Sent Lab Status: In process Updated: 10/19/22 0807     Narrative:       Test Reason : G61.0,     Vent. Rate : 079 BPM     Atrial Rate : 079 BPM     P-R Int : 138 ms          QRS Dur : 098 ms      QT Int : 380 ms       P-R-T Axes : 004 061 042 degrees     QTc Int : 435 ms     Normal sinus rhythm  Normal ECG  When compared with ECG of 18-OCT-2022 08:06,  No significant change was found     Referred By: [email or web address]   SELF           Confirmed By:      EKG 12-lead [[id number]] Collected: 10/05/22 0741     Order Status: Sent Lab Status: In process Updated: 10/18/22 0955     Narrative:       Test Reason : G61.0,     Vent. Rate : 086 BPM     Atrial Rate : 086 BPM     P-R Int : 122 ms          QRS Dur : 102 ms      QT Int : 354 ms       P-R-T Axes : 000 078 044 degrees     QTc Int : 423 ms     Normal sinus rhythm  Normal ECG  When compared with ECG of 04-OCT-2022 08:21,  No  significant change was found     Referred By: AAAREFERR   SELF           Confirmed By:      EKG 12-lead [[id number]]       Order Status: Sent Lab Status: No result       Serum for Lyme CNS Infection IgG [[id number]]       Order Status: Sent Lab Status: No result       Specimen: Blood               Medications:  Reconciled Home Medications:       Medication List          START taking these medications       enoxaparin 40 mg/0.4 mL Syrg  Commonly known as: LOVENOX  Inject 0.4 mLs (40 mg total) into the skin Daily.      HYDROcodone-acetaminophen  mg per tablet  Commonly known as: NORCO  1 tablet by Per OG tube route every 4 (four) hours as needed for Pain.      pantoprazole 40 mg suspension  Commonly known as: PROTONIX  1 packet (40 mg total) by Per G Tube route 2 (two) times daily.      pregabalin 225 MG Cap  Commonly known as: LYRICA  Take 1 capsule (225 mg total) by mouth 2 (two) times daily.      senna-docusate 8.6-50 mg 8.6-50 mg per tablet  Commonly known as: PERICOLACE  1 tablet by Per G Tube route once daily.      silodosin 4 mg Cap capsule  Commonly known as: RAPAFLO  Take 1 capsule (4 mg total) by mouth once daily.  Start taking on: October 22, 2022      tetrahydrozoline 0.05% 0.05 % Drop  Commonly known as: Vision Clear  Place 1 drop into both eyes 3 (three) times daily.                CONTINUE taking these medications       vitamin D 1000 units Tab  Commonly known as: VITAMIN D3  Take 1,000 Units by mouth once daily.                STOP taking these medications       esomeprazole 20 MG capsule  Commonly known as: NEXIUM      fluticasone propionate 50 mcg/actuation nasal spray  Commonly known as: FLONASE                   Indwelling Lines/Drains at time of discharge:   Lines/Drains/Airways         Peripherally Inserted Central Catheter Line  Duration                PICC Double Lumen 10/09/22 1132 left basilic 12 days                  Drain  Duration                     Gastrostomy/Enterostomy 10/12/22  0930 midline 9 days                          Time spent on the discharge of patient: >30 minutes           Luis Fernando Griffin MD  Department of Hospital Medicine  [facility]      Electronically signed by Luis Fernando Griffin MD at 10/22/2022  6:55 AM        After this hospitalization, the patient was placed in a rehab facility.  Below is the discharge summary from it.  Discharge Summaries  - documented in this encounter  Eden Major MD - 11/16/2022 9:35 AM CST  Formatting of this note is different from the original.  Images from the original note were not included.  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    Secondary Diagnosis: Principal Problem:  Guillain-Bude syndrome    HPI: [age] year-old gentleman with past medical history significant for polymyalgia rheumatica, low back pain, status post low back surgery in the past presented to hospital with tingling numbness in upper and lower extremities with progressive weakness to a point where he had difficulty getting up and getting around. Patient also with shortness of breath was admitted to hospital on 09/25/2022, intubated. Patient had a lumbar puncture consistent with Guillain-Bude. Patient was treated with the IVIG. Patient was extubated on 10/07/2022, dysphagia needing PEG tube placement, quadriparesis with total dependency in ADLs, transfers, mobility is transferred down to us for further rehabilitation.    No past medical history on file.     Hospital Course: Patient's course on the rehab unit has been with steady progress. Patient's blood  pressures stayed stable throughout his stay here initially had some orthostatic episodes and have resolved. Patient also had hypokalemic episodes needing potassium supplements. Had problems with constipation needing GoLYTELY. Patient with steady increase in active range of motion and motor strength in all 4 extremities. Dysphagia resolved and has been tolerating p.o. diet well. Patient has done well therapy wise. Patient's family participated in the training. Patient is discharged home today with family to continue with outpatient therapy. Patient to have a follow-up appointment with gastroenterologist tomorrow for PEG removal, PCP and neurologist in the next 2 weeks.    Section GG CARE Scores - Current All Therapy  Physical Therapy   Car Transfer Car Transfer - CARE Score: 4 (11/15/22 1548 : Rik Dawkins)   Walk 10 Feet Walk 10 Feet - CARE Score: 1 (11/15/22 1548 : Rik Dawkins)   Walk 50 Feet with Two Turns Walk 50 Feet with Two Turns - CARE Score: 1 (11/15/22 1548 : Rik Dawkins)   Walk 150 Feet Walk 150 Feet - CARE Score: 88 (11/15/22 1548 : Rik Dawkins)   Walking 10 Feet on Uneven Surfaces Walking 10 Feet on Uneven Surfaces - CARE Score: 1 (11/15/22 1548 : Rik Dawkins)   1 Step (Curb) 1 Step (Curb) - CARE Score: 1 (11/15/22 1548 : Rik Dawkins)   4 Steps 4 Steps - CARE Score: 88 (11/15/22 1548 : Rik Dawkins)   12 Steps 12 Steps - CARE Score: 88 (11/15/22 1548 : Rik Dawkins)   Picking Up Object Picking Up Object - CARE Score: 88 (11/15/22 1548 : Rik Dawkins)   Wheel 50 Feet with Two Turns Wheel 50 Feet with Two Turns - CARE Score: 6 (11/15/22 1548 : Rik Dawkins)   Wheel 150 Feet Wheel 150 Feet - CARE Score: 6 (11/15/22 1548 : Rik Dawkins)   Occupational Therapy   Eating Eating - CARE Score: 6 (11/16/22 0738 : Bria Hung, RN)   Oral Hygiene Oral Hygiene - CARE Score: 6 (11/16/22 0625 : Batool Muniz RN)   Toileting Hygiene Toileting Hygiene - CARE Score: 2 (11/15/22 1120 :  "Tatyana Julio OT)   Shower/Bathe Self Shower/Bathe Self - CARE Score: 3 (11/15/22 1120 : Tatyana Julio OT)   Upper Body Dressing Upper Body Dressing - CARE Score: 6 (11/15/22 1120 : Tatyana Julio OT)   Lower Body Dressing Lower Body Dressing - CARE Score: 4 (11/15/22 1120 : Tatyana Julio OT)   Putting On/Taking Off Footwear Putting On/Taking Off Footwear - CARE Score: 6 (11/15/22 1120 : Tatyana Julio OT)   Roll Left and Right Roll Left and Right - CARE Score: 6 (11/15/22 1120 : Tatyana Julio OT)   Sit to Lying Sit to Lying - CARE Score: 4 (11/15/22 1120 : Tatyana Julio OT)   Lying to Sitting on Side of Bed Lying to Sitting on Side of Bed - CARE Score: 3 (11/15/22 1120 : Tatyana Julio OT)   Sit to Stand Sit to Stand - CARE Score: 1 (11/15/22 1120 : Tatyana Julio OT)   Chair/Bed-to-Chair Transfer Chair/Bed-to-Chair Transfer - CARE Score: 4 (11/15/22 1120 : Tatyana Julio OT)   Toilet Transfer Toilet Transfer - CARE Score: 4 (11/16/22 0625 : Batool Muniz RN)   Speech Therapy   Expression of Ideas and Wants Expression of Ideas and Wants: Without difficulty (11/15/22 1616 : Tatyana Julio OT)   Understanding Verbal and Non-Verbal Content Understanding Verbal and Non-Verbal Content: Understands (11/15/22 1616 : Tatyana Julio OT)   BIMS Brief Interview for Mental Status (BIMS)  Repetition of Three Words (First Attempt): 3 (11/15/22 1616 : Tatyana Julio OT)  Temporal Orientation: Year: Correct (11/15/22 1616 : Tatyana Julio OT)  Temporal Orientation: Month: Accurate within 5 days (11/15/22 1616 : Tatyana Julio OT)  Temporal Orientation: Day: Correct (11/15/22 1616 : Tatyana Julio, OT)  Recall: "Sock": Yes, no cue required (11/15/22 1616 : Tatyana Julio, MINDA)  Recall: "Blue": Yes, no cue required (11/15/22 1616 : Tatyana Julio, MINDA)  Recall: "Bed": Yes, no cue required (11/15/22 1616 : Tatyana Julio, MINDA)  BIMS Summary Score: 15 (11/15/22 1616 : Tatyana STOKES" MINDA Julio)   Memory/Recall Ability     Discharged Condition: good      Dietary Orders   (From admission, onward)         Start Ordered   11/07/22 1200 Nutritional supplement Ensure Enlive; 1 each; Oral 2 times daily at lunch and dinner   Comments: chocolate   End/Expires: Until Specified   Question Answer Comment   Select Supplement: Ensure Enlive   Volume: 1 each   Administration Route: Oral     11/07/22 1146   10/21/22 2127 Adult Diet Regular; 7 Regular (Regular Texture); 0 Thin (All Liquids) Diet effective now   End/Expires: Until Specified   References: [employer] Website   Question Answer Comment   Diet Type: Regular   Diet Texture: 7 Regular (Regular Texture)   Liquid Consistency: 0 Thin (All Liquids)     10/21/22 2126             Disposition: Home with outpatient therapy.    Follow Up: Follow up with gastroenterologist tomorrow, PCP, neurologist in the next 2 weeks.    Medications:    Medication List     START taking these medications   Prescription Last Dose and Time given   acetaminophen 325 MG tablet  Commonly known as: TYLENOL  Instructions: 2 tablets (650 mg total) by PO/Per Tube route every 6 (six) hours as needed for mild pain or Temp > or equal to 101F (38.3C).  Refill: 0    amitriptyline 25 MG tablet  Commonly known as: ELAVIL  Instructions: Take 1 tablet (25 mg total) by mouth nightly.  Dispense: 30 tablet  Refill: 0  25 mg on November 15, 2022 9:34 PM    HYDROcodone-acetaminophen  MG per tablet  Commonly known as: LORCET PLUS  Instructions: 1 tablet by PO/Per Tube route every 8 (eight) hours as needed for severe pain Indications: Pain. Max Daily Amount: 3 tablets  Dispense: 30 tablet  Refill: 0  1 tablet on November 15, 2022 9:33 PM    melatonin tablet  Instructions: Take 2 tablets (6 mg total) by mouth nightly as needed for sleep.  Refill: 0    meloxicam 15 MG tablet  Commonly known as: MOBIC  Instructions: Take 0.5 tablets (7.5 mg total) by mouth daily.  Dispense: 30 tablet  Refill: 0  15 mg  on November 16, 2022 7:17 AM    polyethylene glycol 17 g packet  Commonly known as: MIRALAX  Instructions: Take 17 g by mouth 2 (two) times a day.  Dispense: 10 each  Refill: 0  17 g on November 16, 2022 7:17 AM    rivaroxaban 10 MG tablet  Commonly known as: XARELTO  Instructions: Take 1 tablet (10 mg total) by mouth daily Indications: DVT prophylaxis.  Dispense: 30 tablet  Refill: 0  10 mg on November 16, 2022 7:17 AM      CONTINUE taking these medications   Prescription Last Dose and Time given   cholecalciferol 50 MCG (2000 UT) tablet tablet  Commonly known as: VITAMIN D3  Instructions: Take 1,000 Units by mouth in the morning.  Refill: 0    esomeprazole 20 MG capsule  Commonly known as: NexIUM  Instructions: Take 20 mg by mouth Daily before breakfast.  Refill: 0    fluticasone 50 MCG/ACT nasal solution  Commonly known as: FLONASE  Instructions: Administer 2 sprays into each nostril in the morning.  Refill: 0  50 mcg on November 16, 2022 7:17 AM          More than 30 minutes spent on the discharge of patient.    EDEN MAJOR MD   Department of Physical Medicine & Rehab  [unfilled]    Electronically signed by Eden Major MD at 11/16/2022 10:41 AM EST   Discharge Instructions  - documented in this encounter  Table of Contents for Discharge Instructions   Discharge Instr - Activity   Discharge Instr - Diet   Discharge Instr-PT   Discharge Instr-OT   Discharge Instr- Wound   Discharge Instr-RT   Additional Instructions      Discharge Instr - Activity  Aislinn Edwards RN - 11/16/2022 7:54 AM CST    Formatting of this note might be different from the original.  Continue to follow fall precautions learned in inpatient rehab  Electronically signed by Aislinn Edwards RN at 11/16/2022 8:54 AM EST     Back to top of Discharge Instructions  Discharge Instr - Diet  Aislinn Edwards RN - 11/16/2022 7:53 AM CST    Formatting of this note might be different from the original.  Maintain a regular  diet  Electronically signed by Aislinn Edwards RN at 11/16/2022 8:53 AM EST     Back to top of Discharge Instructions  Discharge Instr-PT  Rik Dawkins - 11/15/2022 3:47 PM CST    Formatting of this note might be different from the original.  When transferring from the wheelchair to other surfaces (bed, sofa, etc.) ensure that you are close enough to the surface you are going to and that the slide board is appropriately placed. Continue to do the leg exercises that you have been doing in bed until you begin outpatient physical therapy. Try to stay sitting up for longer durations to give your body a challenge and build up your physical endurance.  Electronically signed by Rik Dawkins at 11/15/2022 4:47 PM EST     Back to top of Discharge Instructions  Discharge Instr-OT  Tatyana Julio OT - 11/15/2022 4:17 PM CST    Formatting of this note might be different from the original.  Discharge instructions provided to patient, he confirms understanding and agreement with recommendations.   Electronically signed by Tatyana Julio OT at 11/15/2022 5:17 PM EST     Back to top of Discharge Instructions  Discharge Instr- Wound  Gerladine Barakat RN - 11/14/2022 10:57 AM CST    Formatting of this note might be different from the original.  Keep skin clean and dry.    Clean around peg site with wound cleanser or soap and water when bathing, and dry.      Electronically signed by Geraldine Barakat RN at 11/14/2022 11:57 AM EST     Back to top of Discharge Instructions  Discharge Instr-RT  Aislinn Edwards RN - 11/16/2022 7:54 AM CST    Formatting of this note might be different from the original.  N/A  Electronically signed by Aislinn Edwards RN at 11/16/2022 8:54 AM EST     Back to top of Discharge Instructions  Additional Instructions  Aislinn Edwards RN - 11/16/2022 7:54 AM CST    Formatting of this note might be different from the original.  What to do for changes in behavior:   After a hospitalization, different  conditions can produce new and disturbing behaviors. Often times this may be a response to basic needs not being met, frustration or confusion. This can be distressing to both the patient and their family and friends.     To help keep him/her safe and calm, consider these suggestions:  Make sure all physical needs are met including hunger, thirst, and bathroom needs.  Keep lights, noise and movement low. Attempt to use natural light when possible. Limit number of people in the room.  Establish routines and keep them consistent. Minimize changes to daily schedule.  Establish good sleep routines. Schedule rest breaks during the day.   When approaching their personal space, communicate what you will do to assist them prior to approaching their personal space.   As a caregiver, take care of yourself. If you are feeling frustrated or overwhelmed, consider seeking assistance from professionals - (primary care physician, , mental health professionals, local support groups).     If you see changes in behavior that might mean they are becoming agitated or restless. This can be observed as - changes in breathing, facial expressions or body language. If so, consider these tips:  Stay calm and control all aspects of your voice including tone, volume and pace. Try not to raise your voice.  Reassure them that they are safe. Do not challenge a feeling, argue, or confront.  Listen carefully.  Communicate in simple, clear sentences and limit choices.  Choose your words carefully. Do not use sarcasm or humor.   Try redirection (different topic; changing rooms) or encouragement to attend to a reasonable and safe activity they may enjoy.     Electronically signed by Aislinn Edwards RN at 11/16/2022 8:54 AM EST     Back to top of Discharge Instructions  Medications at Time of Discharge  - documented as of this encounter  Medications at Time of Discharge  Medication Sig Dispensed Refills Start Date End Date    acetaminophen (TYLENOL) 325 MG tablet   2 tablets (650 mg total) by PO/Per Tube route every 6 (six) hours as needed for mild pain or Temp > or equal to 101F (38.3C).   0 11/15/2022     amitriptyline (ELAVIL) 25 MG tablet   Take 1 tablet (25 mg total) by mouth nightly. 30 tablet   0 11/15/2022     HYDROcodone-acetaminophen (LORCET PLUS)  MG per tablet    Indications: Pain 1 tablet by PO/Per Tube route every 8 (eight) hours as needed for severe pain Indications: Pain. Max Daily Amount: 3 tablets 30 tablet   0 11/15/2022     melatonin tablet   Take 2 tablets (6 mg total) by mouth nightly as needed for sleep.   0 11/15/2022     meloxicam (MOBIC) 15 MG tablet   Take 0.5 tablets (7.5 mg total) by mouth daily. 30 tablet   0 11/16/2022     polyethylene glycol (MIRALAX) 17 g packet   Take 17 g by mouth 2 (two) times a day. 10 each   0 11/15/2022     rivaroxaban (XARELTO) 10 MG tablet    Indications: DVT prophylaxis Take 1 tablet (10 mg total) by mouth daily Indications: DVT prophylaxis. 30 tablet   0 11/16/2022     cholecalciferol (VITAMIN D3) 50 MCG (2000 UT) tablet tablet   Take 1,000 Units by mouth in the morning.   0       esomeprazole (NexIUM) 20 MG capsule   Take 20 mg by mouth Daily before breakfast.   0       fluticasone (FLONASE) 50 MCG/ACT nasal solution   Administer 2 sprays into each nostril in the morning.   0         Ordered Prescriptions  - documented in this encounter  Reconcile with Patient's Chart  Ordered Prescriptions  Prescription Sig Dispensed Refills Start Date End Date   rivaroxaban (XARELTO) 10 MG tablet    Indications: DVT prophylaxis Take 1 tablet (10 mg total) by mouth daily Indications: DVT prophylaxis. 30 tablet   0 11/16/2022     polyethylene glycol (MIRALAX) 17 g packet   Take 17 g by mouth 2 (two) times a day. 10 each   0 11/15/2022     meloxicam (MOBIC) 15 MG tablet   Take 0.5 tablets (7.5 mg total) by mouth daily. 30 tablet   0 11/16/2022     melatonin tablet   Take 2 tablets (6 mg  total) by mouth nightly as needed for sleep.   0 11/15/2022     HYDROcodone-acetaminophen (LORCET PLUS)  MG per tablet    Indications: Pain 1 tablet by PO/Per Tube route every 8 (eight) hours as needed for severe pain Indications: Pain. Max Daily Amount: 3 tablets 30 tablet   0 11/15/2022     amitriptyline (ELAVIL) 25 MG tablet   Take 1 tablet (25 mg total) by mouth nightly. 30 tablet   0 11/15/2022     acetaminophen (TYLENOL) 325 MG tablet   2 tablets (650 mg total) by PO/Per Tube route every 6 (six) hours as needed for mild pain or Temp > or equal to 101F (38.3C).               Problem List Items Addressed This Visit       Guillain Barré syndrome - Primary    Relevant Medications    XARELTO 10 mg Tab    HYDROcodone-acetaminophen (NORCO)  mg per tablet    Hypokalemia    Relevant Orders    Comprehensive Metabolic Panel    Hyponatremia    Relevant Orders    Comprehensive Metabolic Panel    Polymyalgia rheumatica    Overview     He has PMR and is followed by Dr. Casper. He has improved and he is now on controlled meds with plaquenil.  He has done better with this.           Relevant Medications    HYDROcodone-acetaminophen (NORCO)  mg per tablet    Transaminitis    Relevant Orders    Comprehensive Metabolic Panel     Other Visit Diagnoses       Acute kidney injury        Relevant Orders    Comprehensive Metabolic Panel    Hypertension, unspecified type        History of pneumonia        Relevant Orders    X-Ray Chest PA And Lateral            Past Medical History:  Past Medical History:   Diagnosis Date    Cancer skin cancer on head     Past Surgical History:   Procedure Laterality Date    COLONOSCOPY N/A 06/05/2019    Procedure: COLONOSCOPY;  Surgeon: Kaiden Gallagher III, MD;  Location: [facility];  Service: Endoscopy;  Laterality: N/A;    ESOPHAGOGASTRODUODENOSCOPY N/A 10/12/2022    Procedure: EGD (ESOPHAGOGASTRODUODENOSCOPY);  Surgeon: Macario Alexis MD;  Location: [facility];  Service:  Endoscopy;  Laterality: N/A;    GASTROSTOMY TUBE PLACEMENT      HERNIA REPAIR      SPINE SURGERY       Review of patient's allergies indicates:  No Known Allergies  Current Outpatient Medications on File Prior to Visit   Medication Sig Dispense Refill    amitriptyline (ELAVIL) 25 MG tablet Take 25 mg by mouth every evening.      cholecalciferol, vitamin D3, (VITAMIN D3) 50 mcg (2,000 unit) Tab Take 1,000 Units by mouth.      esomeprazole (NEXIUM) 20 MG capsule Take 20 mg by mouth.      fluticasone propionate (FLONASE) 50 mcg/actuation nasal spray 2 sprays by Nasal route.      HYDROcodone-acetaminophen (NORCO)  mg per tablet 1 tablet by Per OG tube route every 4 (four) hours as needed for Pain.  0    meloxicam (MOBIC) 15 MG tablet Take by mouth.      polyethylene glycol (GLYCOLAX) 17 gram/dose powder Take 17 g by mouth.      vitamin D (VITAMIN D3) 1000 units Tab Take 1,000 Units by mouth once daily.      [DISCONTINUED] enoxaparin (LOVENOX) 40 mg/0.4 mL Syrg Inject 0.4 mLs (40 mg total) into the skin Daily.      [DISCONTINUED] HYDROcodone-acetaminophen (NORCO)  mg per tablet 1 tablet by Per G Tube route every 8 (eight) hours as needed.      [DISCONTINUED] XARELTO 10 mg Tab Take 10 mg by mouth.      [DISCONTINUED] pantoprazole (PROTONIX) 40 mg suspension 1 packet (40 mg total) by Per G Tube route 2 (two) times daily. (Patient not taking: Reported on 12/7/2022)      [DISCONTINUED] pregabalin (LYRICA) 225 MG Cap Take 1 capsule (225 mg total) by mouth 2 (two) times daily. (Patient not taking: Reported on 12/7/2022)      [DISCONTINUED] senna-docusate 8.6-50 mg (PERICOLACE) 8.6-50 mg per tablet 1 tablet by Per G Tube route once daily. (Patient not taking: Reported on 12/7/2022)      [DISCONTINUED] silodosin (RAPAFLO) 4 mg Cap capsule Take 1 capsule (4 mg total) by mouth once daily. (Patient not taking: Reported on 12/7/2022) 30 capsule 11    [DISCONTINUED] tetrahydrozoline 0.05% (VISION CLEAR)  0.05 % Drop Place 1 drop into both eyes 3 (three) times daily. (Patient not taking: Reported on 12/7/2022)  0     No current facility-administered medications on file prior to visit.     Social History     Socioeconomic History    Marital status:    Tobacco Use    Smoking status: Never    Smokeless tobacco: Never   Substance and Sexual Activity    Alcohol use: No    Drug use: No    Sexual activity: Yes     Partners: Female     Family History   Problem Relation Age of Onset    Hypertension Mother     Cancer Father     Hypertension Maternal Grandmother     Thyroid disease Maternal Grandmother     Amblyopia Neg Hx     Blindness Neg Hx     Cataracts Neg Hx     Diabetes Neg Hx     Glaucoma Neg Hx     Macular degeneration Neg Hx     Retinal detachment Neg Hx     Strabismus Neg Hx     Stroke Neg Hx        Review of Systems   Constitutional:  Positive for fatigue.   Respiratory:  Negative for cough and shortness of breath.    Cardiovascular:  Negative for chest pain.   Genitourinary:  Negative for difficulty urinating, dysuria and hematuria.   Musculoskeletal:  Positive for arthralgias and myalgias.   Neurological:  Positive for weakness. Negative for tremors.     Objective:     BP 98/69 (BP Location: Left arm)   Pulse 97   Temp 98.8 °F (37.1 °C)   Resp 18   Ht 6' (1.829 m)   Wt 81.8 kg (180 lb 3.6 oz)   SpO2 99%   BMI 24.44 kg/m²     Physical Exam  Vitals reviewed.   Constitutional:       General: He is not in acute distress (he is in a wheelchair.).     Appearance: Normal appearance. He is well-developed. He is not ill-appearing or diaphoretic.   HENT:      Head: Normocephalic and atraumatic.      Right Ear: Tympanic membrane, ear canal and external ear normal.      Left Ear: Tympanic membrane, ear canal and external ear normal.      Nose: Nose normal.      Mouth/Throat:      Pharynx: No oropharyngeal exudate.   Eyes:      General: No scleral icterus.        Right eye: No discharge.          Left eye: No discharge.      Conjunctiva/sclera: Conjunctivae normal.      Pupils: Pupils are equal, round, and reactive to light.   Neck:      Thyroid: No thyromegaly.      Vascular: No JVD.   Cardiovascular:      Rate and Rhythm: Normal rate and regular rhythm.      Heart sounds: Normal heart sounds. No murmur heard.    No friction rub. No gallop.   Pulmonary:      Effort: Pulmonary effort is normal. No respiratory distress.      Breath sounds: Normal breath sounds. No wheezing or rales.   Chest:      Chest wall: No tenderness.   Abdominal:      General: Bowel sounds are normal. There is no distension.      Palpations: Abdomen is soft. There is no mass.      Tenderness: There is no abdominal tenderness. There is no guarding or rebound.       Musculoskeletal:         General: No tenderness. Normal range of motion.      Cervical back: Normal range of motion and neck supple.   Lymphadenopathy:      Cervical: No cervical adenopathy.   Skin:     General: Skin is warm and dry.   Neurological:      Mental Status: He is alert and oriented to person, place, and time.      Cranial Nerves: No cranial nerve deficit.      Coordination: Coordination normal.      Comments: He can stand out of the wheelchair.       Assessment:     1. Guillain Barré syndrome    2. Acute kidney injury    3. Transaminitis    4. Hypertension, unspecified type    5. Hyponatremia    6. Hypokalemia    7. Polymyalgia rheumatica    8. History of pneumonia        Plan:     Problem List Items Addressed This Visit       Guillain Barré syndrome - Primary    Relevant Medications    XARELTO 10 mg Tab    HYDROcodone-acetaminophen (NORCO)  mg per tablet    Hypokalemia    Relevant Orders    Comprehensive Metabolic Panel    Hyponatremia    Relevant Orders    Comprehensive Metabolic Panel    Polymyalgia rheumatica    Relevant Medications    HYDROcodone-acetaminophen (NORCO)  mg per tablet    Transaminitis    Relevant Orders    Comprehensive Metabolic  Panel     Other Visit Diagnoses       Acute kidney injury        Relevant Orders    Comprehensive Metabolic Panel    Hypertension, unspecified type        History of pneumonia        Relevant Orders    X-Ray Chest PA And Lateral          No follow-ups on file.

## 2022-12-23 ENCOUNTER — PATIENT MESSAGE (OUTPATIENT)
Dept: FAMILY MEDICINE | Facility: CLINIC | Age: 72
End: 2022-12-23
Payer: MEDICARE

## 2023-01-06 ENCOUNTER — TELEPHONE (OUTPATIENT)
Dept: FAMILY MEDICINE | Facility: CLINIC | Age: 73
End: 2023-01-06
Payer: MEDICARE

## 2023-01-06 NOTE — TELEPHONE ENCOUNTER
----- Message from Donna Newton sent at 1/6/2023 10:55 AM CST -----  Contact: [facility] Nurse(Iron)-[phone]  Iron from [facility] called in for Melany regarding attached patient. Please call them back at [phone]. Qing/TYREE

## 2023-01-23 PROBLEM — N17.9 AKI (ACUTE KIDNEY INJURY): Status: RESOLVED | Noted: 2022-10-09 | Resolved: 2023-01-23

## 2023-01-23 PROBLEM — G70.9 ACUTE NEUROMUSCULAR RESPIRATORY FAILURE: Status: RESOLVED | Noted: 2022-09-30 | Resolved: 2023-01-23

## 2023-01-23 PROBLEM — J96.90 ACUTE NEUROMUSCULAR RESPIRATORY FAILURE: Status: RESOLVED | Noted: 2022-09-30 | Resolved: 2023-01-23

## 2023-01-23 PROBLEM — J96.00 ACUTE NEUROMUSCULAR RESPIRATORY FAILURE: Status: RESOLVED | Noted: 2022-09-30 | Resolved: 2023-01-23

## 2023-01-23 PROBLEM — J96.02 ACUTE RESPIRATORY FAILURE WITH HYPERCAPNIA: Status: RESOLVED | Noted: 2022-10-03 | Resolved: 2023-01-23

## 2023-03-09 DIAGNOSIS — G61.0 GUILLAIN BARRÉ SYNDROME: ICD-10-CM

## 2023-03-10 RX ORDER — RIVAROXABAN 10 MG/1
TABLET, FILM COATED ORAL
Qty: 90 TABLET | Refills: 11 | Status: SHIPPED | OUTPATIENT
Start: 2023-03-10 | End: 2024-01-31

## 2023-03-10 NOTE — TELEPHONE ENCOUNTER
The patient requested medicine refills and I did refill it x 1 year.  Message the patient to notify of any health maintenance care gaps that need to be arranged.   Health Maintenance Due   Topic Date Due    Shingles Vaccine (1 of 2) Never done    Influenza Vaccine (1) 09/01/2022     BP Readings from Last 3 Encounters:   12/07/22 98/69   10/21/22 110/67   10/12/22 (!) 94/59     Lab Results   Component Value Date    HGBA1C 5.1 09/25/2022

## 2023-03-30 ENCOUNTER — TELEPHONE (OUTPATIENT)
Dept: FAMILY MEDICINE | Facility: CLINIC | Age: 73
End: 2023-03-30
Payer: MEDICARE

## 2023-03-30 DIAGNOSIS — Z12.5 ENCOUNTER FOR PROSTATE CANCER SCREENING: ICD-10-CM

## 2023-03-30 DIAGNOSIS — I10 ESSENTIAL HYPERTENSION: Primary | ICD-10-CM

## 2023-03-30 NOTE — TELEPHONE ENCOUNTER
----- Message from Alena Mistry MA sent at 3/30/2023 10:16 AM CDT -----  Please contact patient he would like to schedule an appointment for lab work.       Thanks  Alena ZUNIGA MA

## 2023-07-05 ENCOUNTER — TELEPHONE (OUTPATIENT)
Dept: ADMINISTRATIVE | Facility: CLINIC | Age: 73
End: 2023-07-05
Payer: MEDICARE

## 2023-07-05 NOTE — TELEPHONE ENCOUNTER
Called pt, no answer; left message informing pt I was calling to remind pt of his in office EAWV on 7/7/23 and to return my call if he had any questions; left my name and number

## 2023-12-08 ENCOUNTER — TELEPHONE (OUTPATIENT)
Dept: FAMILY MEDICINE | Facility: CLINIC | Age: 73
End: 2023-12-08
Payer: MEDICARE

## 2023-12-08 NOTE — TELEPHONE ENCOUNTER
----- Message from Alena Mistry MA sent at 12/7/2023  5:06 PM CST -----  Good Evening     Patient would like to schedule his annual with  along with lab work. I was not able to get him scheduled.      Thanks  Alena

## 2024-01-24 ENCOUNTER — PATIENT OUTREACH (OUTPATIENT)
Dept: ADMINISTRATIVE | Facility: HOSPITAL | Age: 74
End: 2024-01-24
Payer: MEDICARE

## 2024-01-24 NOTE — PROGRESS NOTES
Patient not seen by PCP in 12 months or longer Report: Pt currently has an upcoming appt with PCP.

## 2024-01-31 ENCOUNTER — OFFICE VISIT (OUTPATIENT)
Dept: FAMILY MEDICINE | Facility: CLINIC | Age: 74
End: 2024-01-31
Payer: MEDICARE

## 2024-01-31 VITALS
RESPIRATION RATE: 18 BRPM | BODY MASS INDEX: 26.55 KG/M2 | WEIGHT: 196 LBS | HEIGHT: 72 IN | HEART RATE: 62 BPM | SYSTOLIC BLOOD PRESSURE: 120 MMHG | TEMPERATURE: 99 F | DIASTOLIC BLOOD PRESSURE: 69 MMHG

## 2024-01-31 DIAGNOSIS — Z13.220 ENCOUNTER FOR LIPID SCREENING FOR CARDIOVASCULAR DISEASE: ICD-10-CM

## 2024-01-31 DIAGNOSIS — G61.0 GUILLAIN BARRÉ SYNDROME: Primary | ICD-10-CM

## 2024-01-31 DIAGNOSIS — R29.898 BILATERAL LEG WEAKNESS: ICD-10-CM

## 2024-01-31 DIAGNOSIS — Z12.5 ENCOUNTER FOR PROSTATE CANCER SCREENING: ICD-10-CM

## 2024-01-31 DIAGNOSIS — Z13.6 ENCOUNTER FOR LIPID SCREENING FOR CARDIOVASCULAR DISEASE: ICD-10-CM

## 2024-01-31 DIAGNOSIS — Z13.1 SCREENING FOR DIABETES MELLITUS: ICD-10-CM

## 2024-01-31 DIAGNOSIS — K59.09 OTHER CONSTIPATION: ICD-10-CM

## 2024-01-31 DIAGNOSIS — D64.9 ANEMIA, UNSPECIFIED TYPE: ICD-10-CM

## 2024-01-31 PROBLEM — Z12.11 COLON CANCER SCREENING: Status: RESOLVED | Noted: 2019-06-05 | Resolved: 2024-01-31

## 2024-01-31 PROBLEM — G93.40 ACUTE ENCEPHALOPATHY: Status: RESOLVED | Noted: 2022-10-09 | Resolved: 2024-01-31

## 2024-01-31 PROBLEM — Z75.8 DISCHARGE PLANNING ISSUES: Status: RESOLVED | Noted: 2022-10-18 | Resolved: 2024-01-31

## 2024-01-31 PROBLEM — R06.03 RESPIRATORY DISTRESS: Status: RESOLVED | Noted: 2022-09-26 | Resolved: 2024-01-31

## 2024-01-31 PROCEDURE — 3008F BODY MASS INDEX DOCD: CPT | Mod: CPTII,S$GLB,, | Performed by: FAMILY MEDICINE

## 2024-01-31 PROCEDURE — 1126F AMNT PAIN NOTED NONE PRSNT: CPT | Mod: CPTII,S$GLB,, | Performed by: FAMILY MEDICINE

## 2024-01-31 PROCEDURE — 3288F FALL RISK ASSESSMENT DOCD: CPT | Mod: CPTII,S$GLB,, | Performed by: FAMILY MEDICINE

## 2024-01-31 PROCEDURE — 99999 PR PBB SHADOW E&M-EST. PATIENT-LVL III: CPT | Mod: PBBFAC,,, | Performed by: FAMILY MEDICINE

## 2024-01-31 PROCEDURE — 99214 OFFICE O/P EST MOD 30 MIN: CPT | Mod: S$GLB,,, | Performed by: FAMILY MEDICINE

## 2024-01-31 PROCEDURE — 1101F PT FALLS ASSESS-DOCD LE1/YR: CPT | Mod: CPTII,S$GLB,, | Performed by: FAMILY MEDICINE

## 2024-01-31 PROCEDURE — 3074F SYST BP LT 130 MM HG: CPT | Mod: CPTII,S$GLB,, | Performed by: FAMILY MEDICINE

## 2024-01-31 PROCEDURE — 1159F MED LIST DOCD IN RCRD: CPT | Mod: CPTII,S$GLB,, | Performed by: FAMILY MEDICINE

## 2024-01-31 PROCEDURE — 1157F ADVNC CARE PLAN IN RCRD: CPT | Mod: CPTII,S$GLB,, | Performed by: FAMILY MEDICINE

## 2024-01-31 PROCEDURE — 3078F DIAST BP <80 MM HG: CPT | Mod: CPTII,S$GLB,, | Performed by: FAMILY MEDICINE

## 2024-01-31 NOTE — PATIENT INSTRUCTIONS
Crispin Atkins,     If you are due for any health screening(s) below please notify me so we can arrange them to be ordered and scheduled. Most healthy patients at your age complete them, but you are free to accept or refuse.     If you can't do it, I'll definitely understand. If you can, I'd certainly appreciate it!    Tests to Keep You Healthy    Colon Cancer Screening: Met on 6/5/2019  Last Blood Pressure <= 139/89 (12/7/2022): NO

## 2024-01-31 NOTE — PROGRESS NOTES
Subjective:      Patient ID: Wilbert Luna is a 73 y.o. male.    Chief Complaint: Dizziness (Pt c/o dizziness when in bed at night. Stated has been going on for 3 months. Sx intermittent.)    Problem List Items Addressed This Visit       Bilateral leg weakness    Overview     This is a result of him having GB.  He has fallen a few times due to this.         Guillain Barré syndrome - Primary    Overview     He was in the hospital in 2023 and was home for [place]. He has finished therapy and still has residual weakness and numbness in the legs and feet.           Relevant Orders    Comprehensive Metabolic Panel    Other constipation    Overview     He has chronic constipation and sees a functional doc for this and is on OTC for this.  It helps him.          Other Visit Diagnoses       Encounter for lipid screening for cardiovascular disease        Relevant Orders    Lipid Panel    Screening for diabetes mellitus        Relevant Orders    Comprehensive Metabolic Panel    Encounter for prostate cancer screening        Relevant Orders    PSA, Screening    Anemia, unspecified type        Relevant Orders    CBC Auto Differential    IRON AND TIBC            The patient's Health Maintenance was reviewed and the following appears to be due:   Health Maintenance Due   Topic Date Due    COVID-19 Vaccine (1) Never done    Shingles Vaccine (1 of 2) Never done    RSV Vaccine (Age 60+ and Pregnant patients) (1 - 1-dose 60+ series) Never done    PROSTATE-SPECIFIC ANTIGEN  03/21/2023    Influenza Vaccine (1) 09/01/2023       Past Medical History:  Past Medical History:   Diagnosis Date    Cancer skin cancer on head     Past Surgical History:   Procedure Laterality Date    COLONOSCOPY N/A 06/05/2019    Procedure: COLONOSCOPY;  Surgeon: Kaiden Gallagher III, MD;  Location: [facility];  Service: Endoscopy;  Laterality: N/A;    ESOPHAGOGASTRODUODENOSCOPY N/A 10/12/2022    Procedure: EGD (ESOPHAGOGASTRODUODENOSCOPY);  Surgeon:  Macario Alexis MD;  Location: [facility];  Service: Endoscopy;  Laterality: N/A;    GASTROSTOMY TUBE PLACEMENT      HERNIA REPAIR      SPINE SURGERY       Review of patient's allergies indicates:  No Known Allergies  Current Outpatient Medications on File Prior to Visit   Medication Sig Dispense Refill    esomeprazole (NEXIUM) 20 MG capsule Take 20 mg by mouth.      fluticasone propionate (FLONASE) 50 mcg/actuation nasal spray 2 sprays by Nasal route.      [DISCONTINUED] amitriptyline (ELAVIL) 25 MG tablet Take 25 mg by mouth every evening.      [DISCONTINUED] cholecalciferol, vitamin D3, (VITAMIN D3) 50 mcg (2,000 unit) Tab Take 1,000 Units by mouth.      [DISCONTINUED] diclofenac sodium (VOLTAREN) 1 % Gel Apply 2 g topically 4 (four) times daily. 100 g 11    [DISCONTINUED] HYDROcodone-acetaminophen (NORCO)  mg per tablet 1 tablet by Per OG tube route every 4 (four) hours as needed for Pain.  0    [DISCONTINUED] HYDROcodone-acetaminophen (NORCO)  mg per tablet Take 1 tablet by mouth every 8 (eight) hours as needed for Pain. 21 tablet 0    [DISCONTINUED] meloxicam (MOBIC) 15 MG tablet Take by mouth.      [DISCONTINUED] polyethylene glycol (GLYCOLAX) 17 gram/dose powder Take 17 g by mouth.      [DISCONTINUED] vitamin D (VITAMIN D3) 1000 units Tab Take 1,000 Units by mouth once daily.      [DISCONTINUED] XARELTO 10 mg Tab TAKE 1 TABLET(10 MG) BY MOUTH DAILY WITH EVENING MEAL OR DINNER 90 tablet 11     No current facility-administered medications on file prior to visit.     Social History     Socioeconomic History    Marital status:    Tobacco Use    Smoking status: Never    Smokeless tobacco: Never   Substance and Sexual Activity    Alcohol use: No    Drug use: No    Sexual activity: Yes     Partners: Female     Family History   Problem Relation Age of Onset    Hypertension Mother     Cancer Father     Hypertension Maternal Grandmother     Thyroid disease Maternal Grandmother     Amblyopia Neg  Hx     Blindness Neg Hx     Cataracts Neg Hx     Diabetes Neg Hx     Glaucoma Neg Hx     Macular degeneration Neg Hx     Retinal detachment Neg Hx     Strabismus Neg Hx     Stroke Neg Hx        Review of Systems   Constitutional: Negative.  Negative for chills, diaphoresis and fever.   HENT:  Negative for congestion, hearing loss, mouth sores, postnasal drip and sore throat.    Eyes:  Negative for pain and visual disturbance.   Respiratory:  Negative for cough, chest tightness, shortness of breath and wheezing.    Cardiovascular:  Negative for chest pain.   Gastrointestinal:  Negative for abdominal pain, anal bleeding, blood in stool, constipation, diarrhea, nausea and vomiting.   Genitourinary:  Negative for dysuria and hematuria.   Musculoskeletal:  Negative for back pain, neck pain and neck stiffness.   Skin:  Negative for rash.   Neurological:  Positive for dizziness. Negative for weakness. Facial asymmetry: he occasionally has dizziness.      Objective:   /69 (BP Location: Right arm, Patient Position: Sitting)   Pulse 62   Temp 98.6 °F (37 °C) (Temporal)   Resp 18   Ht 6' (1.829 m)   Wt 88.9 kg (196 lb)   BMI 26.58 kg/m²     Physical Exam  Vitals reviewed.   Constitutional:       General: He is not in acute distress.     Appearance: Normal appearance. He is well-developed. He is not ill-appearing or diaphoretic.   HENT:      Head: Normocephalic and atraumatic.      Right Ear: Tympanic membrane, ear canal and external ear normal.      Left Ear: Tympanic membrane, ear canal and external ear normal.      Nose: Nose normal.      Mouth/Throat:      Pharynx: No oropharyngeal exudate.   Eyes:      General: No scleral icterus.        Right eye: No discharge.         Left eye: No discharge.      Conjunctiva/sclera: Conjunctivae normal.      Pupils: Pupils are equal, round, and reactive to light.   Neck:      Thyroid: No thyromegaly.      Vascular: No JVD.   Cardiovascular:      Rate and Rhythm: Normal rate  and regular rhythm.      Heart sounds: Normal heart sounds. No murmur heard.     No friction rub. No gallop.   Pulmonary:      Effort: Pulmonary effort is normal. No respiratory distress.      Breath sounds: Normal breath sounds. No wheezing or rales.   Chest:      Chest wall: No tenderness.   Abdominal:      General: Bowel sounds are normal. There is no distension.      Palpations: Abdomen is soft. There is no mass.      Tenderness: There is no abdominal tenderness. There is no guarding or rebound.   Musculoskeletal:         General: No tenderness. Normal range of motion.      Cervical back: Normal range of motion and neck supple.   Lymphadenopathy:      Cervical: No cervical adenopathy.   Skin:     General: Skin is warm and dry.   Neurological:      Mental Status: He is alert and oriented to person, place, and time.      Cranial Nerves: No cranial nerve deficit.      Coordination: Coordination normal.       Assessment:     1. Guillain Barré syndrome    2. Bilateral leg weakness    3. Other constipation    4. Encounter for lipid screening for cardiovascular disease    5. Screening for diabetes mellitus    6. Encounter for prostate cancer screening    7. Anemia, unspecified type      Plan:   I am having Wilbert Luna maintain his esomeprazole and fluticasone propionate.  No problem-specific Assessment & Plan notes found for this encounter.      No follow-ups on file.    Wilbert was seen today for dizziness.    Diagnoses and all orders for this visit:    Guillain Barré syndrome  -     Comprehensive Metabolic Panel; Future    Bilateral leg weakness    Other constipation    Encounter for lipid screening for cardiovascular disease  -     Lipid Panel; Future    Screening for diabetes mellitus  -     Comprehensive Metabolic Panel; Future    Encounter for prostate cancer screening  -     PSA, Screening; Future    Anemia, unspecified type  -     CBC Auto Differential; Future  -     IRON AND TIBC; Future         The patient  was instructed to stop the following meds:  Medications Discontinued During This Encounter   Medication Reason    XARELTO 10 mg Tab Patient no longer taking    HYDROcodone-acetaminophen (NORCO)  mg per tablet Patient no longer taking    diclofenac sodium (VOLTAREN) 1 % Gel Patient no longer taking    HYDROcodone-acetaminophen (NORCO)  mg per tablet Patient no longer taking    cholecalciferol, vitamin D3, (VITAMIN D3) 50 mcg (2,000 unit) Tab Patient no longer taking    vitamin D (VITAMIN D3) 1000 units Tab Patient no longer taking    meloxicam (MOBIC) 15 MG tablet Patient no longer taking    polyethylene glycol (GLYCOLAX) 17 gram/dose powder Patient no longer taking    amitriptyline (ELAVIL) 25 MG tablet Patient no longer taking     Orders Placed This Encounter   Procedures    Comprehensive Metabolic Panel     Standing Status:   Future     Standing Expiration Date:   1/30/2025    CBC Auto Differential     Standing Status:   Future     Standing Expiration Date:   3/31/2025    Lipid Panel     Standing Status:   Future     Standing Expiration Date:   1/30/2025    PSA, Screening     Standing Status:   Future     Standing Expiration Date:   1/31/2025    IRON AND TIBC     Standing Status:   Future     Standing Expiration Date:   3/31/2025       Medication List with Changes/Refills   Current Medications    ESOMEPRAZOLE (NEXIUM) 20 MG CAPSULE    Take 20 mg by mouth.    FLUTICASONE PROPIONATE (FLONASE) 50 MCG/ACTUATION NASAL SPRAY    2 sprays by Nasal route.   Discontinued Medications    AMITRIPTYLINE (ELAVIL) 25 MG TABLET    Take 25 mg by mouth every evening.    CHOLECALCIFEROL, VITAMIN D3, (VITAMIN D3) 50 MCG (2,000 UNIT) TAB    Take 1,000 Units by mouth.    DICLOFENAC SODIUM (VOLTAREN) 1 % GEL    Apply 2 g topically 4 (four) times daily.    HYDROCODONE-ACETAMINOPHEN (NORCO)  MG PER TABLET    1 tablet by Per OG tube route every 4 (four) hours as needed for Pain.    HYDROCODONE-ACETAMINOPHEN (NORCO)   MG PER TABLET    Take 1 tablet by mouth every 8 (eight) hours as needed for Pain.    MELOXICAM (MOBIC) 15 MG TABLET    Take by mouth.    POLYETHYLENE GLYCOL (GLYCOLAX) 17 GRAM/DOSE POWDER    Take 17 g by mouth.    VITAMIN D (VITAMIN D3) 1000 UNITS TAB    Take 1,000 Units by mouth once daily.    XARELTO 10 MG TAB    TAKE 1 TABLET(10 MG) BY MOUTH DAILY WITH EVENING MEAL OR DINNER      Medication List with Changes/Refills   Current Medications    ESOMEPRAZOLE (NEXIUM) 20 MG CAPSULE    Take 20 mg by mouth.       Start Date: --        End Date: --    FLUTICASONE PROPIONATE (FLONASE) 50 MCG/ACTUATION NASAL SPRAY    2 sprays by Nasal route.       Start Date: --        End Date: --   Discontinued Medications    AMITRIPTYLINE (ELAVIL) 25 MG TABLET    Take 25 mg by mouth every evening.       Start Date: 11/15/2022End Date: 1/31/2024    CHOLECALCIFEROL, VITAMIN D3, (VITAMIN D3) 50 MCG (2,000 UNIT) TAB    Take 1,000 Units by mouth.       Start Date: --        End Date: 1/31/2024    DICLOFENAC SODIUM (VOLTAREN) 1 % GEL    Apply 2 g topically 4 (four) times daily.       Start Date: 12/7/2022 End Date: 1/31/2024    HYDROCODONE-ACETAMINOPHEN (NORCO)  MG PER TABLET    1 tablet by Per OG tube route every 4 (four) hours as needed for Pain.       Start Date: 10/21/2022End Date: 1/31/2024    HYDROCODONE-ACETAMINOPHEN (NORCO)  MG PER TABLET    Take 1 tablet by mouth every 8 (eight) hours as needed for Pain.       Start Date: 12/7/2022 End Date: 1/31/2024    MELOXICAM (MOBIC) 15 MG TABLET    Take by mouth.       Start Date: 11/16/2022End Date: 1/31/2024    POLYETHYLENE GLYCOL (GLYCOLAX) 17 GRAM/DOSE POWDER    Take 17 g by mouth.       Start Date: 11/15/2022End Date: 1/31/2024    VITAMIN D (VITAMIN D3) 1000 UNITS TAB    Take 1,000 Units by mouth once daily.       Start Date: --        End Date: 1/31/2024    XARELTO 10 MG TAB    TAKE 1 TABLET(10 MG) BY MOUTH DAILY WITH EVENING MEAL OR DINNER       Start Date: 3/10/2023 End Date:  1/31/2024

## 2024-02-01 ENCOUNTER — LAB VISIT (OUTPATIENT)
Dept: LAB | Facility: HOSPITAL | Age: 74
End: 2024-02-01
Attending: FAMILY MEDICINE
Payer: MEDICARE

## 2024-02-01 DIAGNOSIS — D64.9 ANEMIA, UNSPECIFIED TYPE: ICD-10-CM

## 2024-02-01 DIAGNOSIS — Z12.5 ENCOUNTER FOR PROSTATE CANCER SCREENING: ICD-10-CM

## 2024-02-01 DIAGNOSIS — Z13.1 SCREENING FOR DIABETES MELLITUS: ICD-10-CM

## 2024-02-01 DIAGNOSIS — Z13.6 ENCOUNTER FOR LIPID SCREENING FOR CARDIOVASCULAR DISEASE: ICD-10-CM

## 2024-02-01 DIAGNOSIS — G61.0 GUILLAIN BARRÉ SYNDROME: ICD-10-CM

## 2024-02-01 DIAGNOSIS — Z13.220 ENCOUNTER FOR LIPID SCREENING FOR CARDIOVASCULAR DISEASE: ICD-10-CM

## 2024-02-01 LAB
ALBUMIN SERPL BCP-MCNC: 4.1 G/DL (ref 3.5–5.2)
ALP SERPL-CCNC: 64 U/L (ref 55–135)
ALT SERPL W/O P-5'-P-CCNC: 10 U/L (ref 10–44)
ANION GAP SERPL CALC-SCNC: 10 MMOL/L (ref 8–16)
AST SERPL-CCNC: 21 U/L (ref 10–40)
BASOPHILS # BLD AUTO: 0.04 K/UL (ref 0–0.2)
BASOPHILS NFR BLD: 0.6 % (ref 0–1.9)
BILIRUB SERPL-MCNC: 0.7 MG/DL (ref 0.1–1)
BUN SERPL-MCNC: 13 MG/DL (ref 8–23)
CALCIUM SERPL-MCNC: 10 MG/DL (ref 8.7–10.5)
CHLORIDE SERPL-SCNC: 106 MMOL/L (ref 95–110)
CHOLEST SERPL-MCNC: 225 MG/DL (ref 120–199)
CHOLEST/HDLC SERPL: 3.4 {RATIO} (ref 2–5)
CO2 SERPL-SCNC: 25 MMOL/L (ref 23–29)
COMPLEXED PSA SERPL-MCNC: 1.1 NG/ML (ref 0–4)
CREAT SERPL-MCNC: 1.2 MG/DL (ref 0.5–1.4)
DIFFERENTIAL METHOD BLD: NORMAL
EOSINOPHIL # BLD AUTO: 0.2 K/UL (ref 0–0.5)
EOSINOPHIL NFR BLD: 3 % (ref 0–8)
ERYTHROCYTE [DISTWIDTH] IN BLOOD BY AUTOMATED COUNT: 13.9 % (ref 11.5–14.5)
EST. GFR  (NO RACE VARIABLE): >60 ML/MIN/1.73 M^2
GLUCOSE SERPL-MCNC: 93 MG/DL (ref 70–110)
HCT VFR BLD AUTO: 47.7 % (ref 40–54)
HDLC SERPL-MCNC: 66 MG/DL (ref 40–75)
HDLC SERPL: 29.3 % (ref 20–50)
HGB BLD-MCNC: 15.4 G/DL (ref 14–18)
IMM GRANULOCYTES # BLD AUTO: 0.02 K/UL (ref 0–0.04)
IMM GRANULOCYTES NFR BLD AUTO: 0.3 % (ref 0–0.5)
IRON SERPL-MCNC: 142 UG/DL (ref 45–160)
LDLC SERPL CALC-MCNC: 139.6 MG/DL (ref 63–159)
LYMPHOCYTES # BLD AUTO: 2.5 K/UL (ref 1–4.8)
LYMPHOCYTES NFR BLD: 35.2 % (ref 18–48)
MCH RBC QN AUTO: 28.8 PG (ref 27–31)
MCHC RBC AUTO-ENTMCNC: 32.3 G/DL (ref 32–36)
MCV RBC AUTO: 89 FL (ref 82–98)
MONOCYTES # BLD AUTO: 0.6 K/UL (ref 0.3–1)
MONOCYTES NFR BLD: 9 % (ref 4–15)
NEUTROPHILS # BLD AUTO: 3.6 K/UL (ref 1.8–7.7)
NEUTROPHILS NFR BLD: 51.9 % (ref 38–73)
NONHDLC SERPL-MCNC: 159 MG/DL
NRBC BLD-RTO: 0 /100 WBC
PLATELET # BLD AUTO: 238 K/UL (ref 150–450)
PMV BLD AUTO: 10.1 FL (ref 9.2–12.9)
POTASSIUM SERPL-SCNC: 5.3 MMOL/L (ref 3.5–5.1)
PROT SERPL-MCNC: 7.2 G/DL (ref 6–8.4)
RBC # BLD AUTO: 5.35 M/UL (ref 4.6–6.2)
SATURATED IRON: 35 % (ref 20–50)
SODIUM SERPL-SCNC: 141 MMOL/L (ref 136–145)
TOTAL IRON BINDING CAPACITY: 410 UG/DL (ref 250–450)
TRANSFERRIN SERPL-MCNC: 277 MG/DL (ref 200–375)
TRIGL SERPL-MCNC: 97 MG/DL (ref 30–150)
WBC # BLD AUTO: 6.98 K/UL (ref 3.9–12.7)

## 2024-02-01 PROCEDURE — 83540 ASSAY OF IRON: CPT | Performed by: FAMILY MEDICINE

## 2024-02-01 PROCEDURE — 80061 LIPID PANEL: CPT | Performed by: FAMILY MEDICINE

## 2024-02-01 PROCEDURE — 85025 COMPLETE CBC W/AUTO DIFF WBC: CPT | Performed by: FAMILY MEDICINE

## 2024-02-01 PROCEDURE — 80053 COMPREHEN METABOLIC PANEL: CPT | Performed by: FAMILY MEDICINE

## 2024-02-01 PROCEDURE — 36415 COLL VENOUS BLD VENIPUNCTURE: CPT | Mod: PO | Performed by: FAMILY MEDICINE

## 2024-02-01 PROCEDURE — 84153 ASSAY OF PSA TOTAL: CPT | Performed by: FAMILY MEDICINE

## 2024-02-02 NOTE — PROGRESS NOTES
I have reviewed the labs and am recommending the following:    CMP/BMP NORMAL-The electrolytes all appear stable at this time.  This includes kidney functions along with routine electrolytes like sugar, and sodium.  If it was a CMP test, the liver enzymes were noted to be stable also.  The POTASSIUM was a little high.  Please refrain from overeating potassium rich foods like leafy vegetables and recheck the potassium in 1 week.      Your iron studies were fine.  LIPID-ABNORMAL-DIET RECOMMENDATIONS-The lipid level is currently abnormal.   Due to this, I would like to ask you to start using a low cholesterol diet and start a weight loss diet to help with this.  In addition, exercise has been shown to reduce these levels also.  I recommend a 30 minute walk daily to help achieve this.  Book a lipid panel in one year for a recheck on this.  I will attach some guidelines below to help you with your diet.     Health Maintenance:  Controlling Cholesterol   Cholesterol is a naturally occurring fatty substance.  It has both good and bad effects on the body.  On the good side it builds natural hormones, and helps build and maintain nerve cells.  However, when your body has too much cholesterol, blood vessel walls can thicken and reduce circulation causing heart attacks and strokes.    Most of the cholesterol in your blood is made by your liver from the fats, carbohydrates, and proteins you eat.  Cholesterol also comes directly from foods (animal products only) that you eat.    Measuring Cholesterol   When you get your cholesterol checked, your doctor will give you a number for your total cholesterol level.  Use the following chart to determine how high your cholesterol is.        Total Cholesterol   Level (mg/dL)   ----------------------------------------   200 or below       good    200 to 239         borderline high    240 or above       high   ----------------------------------------    When your cholesterol is measured and  found to be high, the doctor may also check the amount of LDL (low-density lipoprotein) and HDL (high-density lipoprotein) in your blood.  LDL and HDL carry cholesterol through your blood.  LDLs carry a lot of cholesterol, leave behind fatty deposits on your artery walls, and contribute to heart disease.  HDLs do the opposite.  They clean the artery walls and remove extra cholesterol from the body, thus lowering the risk of heart disease.  LDL is sometimes called bad cholesterol and HDL good cholesterol.  It is desirable to have low levels of LDL and high levels of HDL.  (Smoking lowers HDL levels.)       LDL Cholesterol   Level (mg/dL)   ---------------------------------------  130 or below       good   130 to 159         borderline high   160 or above       high   ---------------------------------------    For HDL, a level of 35 mg/dL or below is too low.  The recommended HDL level is 45 mg/dL or higher.    Controlling Your Cholesterol Level   Cholesterol levels can usually be controlled by eating right and exercising.    Follow these diet guidelines to help control your cholesterol:   · Adjust the amount of calories you eat to maintain a lean body weight.    · Reduce the amount of fat you eat.  Fats should contribute no more than 30% of your daily calories and only 10% of the fat you eat should be saturated fat.    There are 9 calories in a gram of fat.  So, to calculate the maximum grams of fat you should eat each day use these formulas:   1. Multiply the maximum number of calories you eat in a day by 0.30 (30%) to calculate the maximum number of calories you should get from fat.    2. Divide the daily number of calories from fat (the answer from the calculation above) by 9 to find the maximum number of grams of fat you should eat each day.    Some kinds of fats are better than others.  Polyunsaturated and monounsaturated fats are better than saturated fats.  Monounsaturated fats are found in olive oil, canola  oil, and avocados.  Polyunsaturated fats are found in fish and some vegetable oils.    Saturated fat raises your blood cholesterol because it makes it hard for the body to clear the cholesterol away.  Saturated fat is found in different amounts in almost all foods.  Butter, some oils, meat, and poultry fat contain a lot of saturated fat.    To control the amount of fat and cholesterol you eat:   · Check food labels for fat and cholesterol content.    · Limit the amount of butter and margarine you eat.    · Remove the skin before you cook chicken or turkey.    · Drink skim milk.    · Use sunflower, safflower, soybean, canola, or olive oil rather than tropical oils such as palm or coconut.    · Choose lean cuts of meat.    · Eat lean chicken, turkey, and fish instead of red meat.    · Use salad dressings and margarine made with monounsaturated fats.    · Eat fruits, vegetables, beans, and whole grains daily.  The fiber in these foods helps lower cholesterol.    · Use egg whites rather than whole eggs.    · Use low-fat yogurt or low-fat cottage cheese instead of sour cream.    Exercise goes hand-in-hand with good nutrition for controlling cholesterol.  Exercise not only helps you keep your weight down, but also can increase your HDL (good cholesterol) level.    A good exercise program includes aerobic exercise.  Aerobic exercise is any activity that keeps your heart rate up (such as swimming, jogging, walking, and bicycling).  You should get 20 to 30 minutes of aerobic exercise every other day.    If you haven't been exercising, start your new exercise program slowly.  Make sure to stretch before and after you exercise.    High cholesterol may run in families.  Know your family history and discuss it with your doctor.    In summary, to control your cholesterol level, eat healthy, exercise sensibly, and check your cholesterol yearly.    Developed by Shraddha Hubbard R.N., M.N., and [employer]        We currently have a health maintenance function on our computer system that helps you and me keep up with when you are due for various screening tests to keep you healthy.  We are trying to make it as up to date and accurate as possible.  Please review this and notify your nurse if you appear to be due for something and are ready to schedule this.  If you have had the testing done elsewhere and need us to update your records, also notify our team to do this.  Please give us the date that it was done so that we can be as accurate as possible. You can send us this information via [employer] in a message entitled Health Maintenance Update in the subject line.     RSV Vaccine (Age 60+ and Pregnant patients)(1 - 1-dose 60+ series) Never done    PSA NORMAL-The PSA screening for prostate cancer is normal.  Recheck annually.    Health maintenance items that remain on your list that need to be arranged are listed below.   Please notify me if you are prepared to get them completed.    RSV Vaccine (Age 60+ and Pregnant patients)(1 - 1-dose 60+ series) Never done    Dr. Sergio Quick

## 2024-05-15 ENCOUNTER — OFFICE VISIT (OUTPATIENT)
Dept: FAMILY MEDICINE | Facility: CLINIC | Age: 74
End: 2024-05-15
Payer: MEDICARE

## 2024-05-15 ENCOUNTER — HOSPITAL ENCOUNTER (OUTPATIENT)
Dept: RADIOLOGY | Facility: HOSPITAL | Age: 74
Discharge: HOME | End: 2024-05-15
Attending: NURSE PRACTITIONER
Payer: MEDICARE

## 2024-05-15 ENCOUNTER — TELEPHONE (OUTPATIENT)
Dept: FAMILY MEDICINE | Facility: CLINIC | Age: 74
End: 2024-05-15

## 2024-05-15 VITALS
SYSTOLIC BLOOD PRESSURE: 121 MMHG | HEART RATE: 50 BPM | WEIGHT: 198.69 LBS | DIASTOLIC BLOOD PRESSURE: 57 MMHG | OXYGEN SATURATION: 100 % | TEMPERATURE: 97 F | HEIGHT: 71 IN | BODY MASS INDEX: 27.81 KG/M2

## 2024-05-15 DIAGNOSIS — S51.011S LACERATION OF RIGHT ELBOW, SEQUELA: ICD-10-CM

## 2024-05-15 DIAGNOSIS — S89.91XD INJURY OF RIGHT LOWER EXTREMITY, SUBSEQUENT ENCOUNTER: ICD-10-CM

## 2024-05-15 DIAGNOSIS — L03.90 CELLULITIS, UNSPECIFIED CELLULITIS SITE: ICD-10-CM

## 2024-05-15 DIAGNOSIS — S59.901D INJURY OF RIGHT ELBOW, SUBSEQUENT ENCOUNTER: ICD-10-CM

## 2024-05-15 DIAGNOSIS — S59.901D INJURY OF RIGHT ELBOW, SUBSEQUENT ENCOUNTER: Primary | ICD-10-CM

## 2024-05-15 PROCEDURE — 1125F AMNT PAIN NOTED PAIN PRSNT: CPT | Mod: CPTII,S$GLB,, | Performed by: NURSE PRACTITIONER

## 2024-05-15 PROCEDURE — 3008F BODY MASS INDEX DOCD: CPT | Mod: CPTII,S$GLB,, | Performed by: NURSE PRACTITIONER

## 2024-05-15 PROCEDURE — 99213 OFFICE O/P EST LOW 20 MIN: CPT | Mod: S$GLB,,, | Performed by: NURSE PRACTITIONER

## 2024-05-15 PROCEDURE — 99999 PR PBB SHADOW E&M-EST. PATIENT-LVL V: CPT | Mod: PBBFAC,,, | Performed by: NURSE PRACTITIONER

## 2024-05-15 PROCEDURE — 3078F DIAST BP <80 MM HG: CPT | Mod: CPTII,S$GLB,, | Performed by: NURSE PRACTITIONER

## 2024-05-15 PROCEDURE — 73552 X-RAY EXAM OF FEMUR 2/>: CPT | Mod: 26,RT,, | Performed by: RADIOLOGY

## 2024-05-15 PROCEDURE — 3074F SYST BP LT 130 MM HG: CPT | Mod: CPTII,S$GLB,, | Performed by: NURSE PRACTITIONER

## 2024-05-15 PROCEDURE — 73552 X-RAY EXAM OF FEMUR 2/>: CPT | Mod: TC,PO,RT

## 2024-05-15 PROCEDURE — 73080 X-RAY EXAM OF ELBOW: CPT | Mod: TC,PO,RT

## 2024-05-15 PROCEDURE — 3288F FALL RISK ASSESSMENT DOCD: CPT | Mod: CPTII,S$GLB,, | Performed by: NURSE PRACTITIONER

## 2024-05-15 PROCEDURE — 1160F RVW MEDS BY RX/DR IN RCRD: CPT | Mod: CPTII,S$GLB,, | Performed by: NURSE PRACTITIONER

## 2024-05-15 PROCEDURE — 73080 X-RAY EXAM OF ELBOW: CPT | Mod: 26,RT,, | Performed by: RADIOLOGY

## 2024-05-15 PROCEDURE — 1101F PT FALLS ASSESS-DOCD LE1/YR: CPT | Mod: CPTII,S$GLB,, | Performed by: NURSE PRACTITIONER

## 2024-05-15 PROCEDURE — 1157F ADVNC CARE PLAN IN RCRD: CPT | Mod: CPTII,S$GLB,, | Performed by: NURSE PRACTITIONER

## 2024-05-15 PROCEDURE — 1159F MED LIST DOCD IN RCRD: CPT | Mod: CPTII,S$GLB,, | Performed by: NURSE PRACTITIONER

## 2024-05-15 RX ORDER — DOXYCYCLINE HYCLATE 100 MG
100 TABLET ORAL 2 TIMES DAILY
Qty: 20 EACH | Refills: 0 | Status: SHIPPED | OUTPATIENT
Start: 2024-05-15 | End: 2024-05-25

## 2024-05-15 NOTE — PATIENT INSTRUCTIONS
Avoid prolonged sun exposure while taking doxycycline  Ice to affected area 2-3x/d  Avoid strenuous activity, lifting  Hydrate well  Rest  Report to ER immediately if symptoms worsen or persist    Crispin Atkins,     If you are due for any health screening(s) below please notify me so we can arrange them to be ordered and scheduled. Most healthy patients at your age complete them, but you are free to accept or refuse.     If you can't do it, I'll definitely understand. If you can, I'd certainly appreciate it!    All of your core healthy metrics are met.

## 2024-05-15 NOTE — TELEPHONE ENCOUNTER
----- Message from Lorelei Lepe sent at 5/15/2024 10:20 AM CDT -----  Regarding: missed call  Type:  Patient Returning Call    Who Called:Wilbert  Who Left Message for Patient:Rose  Does the patient know what this is regarding?:visit  Would the patient rather a call back or a response via [facility]? Call back  Best Call Back Number:[phone]  Additional Information:

## 2024-05-15 NOTE — PROGRESS NOTES
"Subjective     Patient ID: Wilbert Luna is a 73 y.o. male.    Chief Complaint: Leg Pain and Arm Pain    Elbow Injury  This is a recurrent (pt states tree fell on him 3 w ago; states did not go to ER; states has laceration to elbow; states, "it was opened and I told my wife to put a bandage on it to close it. I thought my femur was broken and I called up here but they didn't have anything.") problem. The current episode started 1 to 4 weeks ago (3w ago). The problem occurs daily. The problem has been unchanged (Laceration healed). Associated symptoms include arthralgias. Pertinent negatives include no abdominal pain, anorexia, change in bowel habit, chest pain, chills, congestion, coughing, diaphoresis, fatigue, fever, headaches, joint swelling, myalgias, nausea, neck pain, numbness, rash, sore throat, swollen glands, urinary symptoms, vertigo, visual change, vomiting or weakness. Associated symptoms comments: Swelling, redness to right elbow. Nothing aggravates the symptoms. He has tried nothing for the symptoms. The treatment provided no relief.   Leg Pain   The incident occurred more than 1 week ago (Leg injury 3 w ago; large piece of tree fell on; pt did not report to the ER; refer to above comment). The incident occurred at home (3w ago). The injury mechanism was a direct blow. The pain is present in the right thigh. The quality of the pain is described as aching. The pain is mild (Was sever following injury). The pain has been Improving since onset. Pertinent negatives include no inability to bear weight, loss of motion, loss of sensation, muscle weakness, numbness or tingling. He reports no foreign bodies present. The symptoms are aggravated by movement. He has tried nothing for the symptoms. The treatment provided moderate relief.     Past Medical History:   Diagnosis Date    Cancer skin cancer on head     Social History     Socioeconomic History    Marital status:    Tobacco Use    Smoking " status: Never    Smokeless tobacco: Never   Substance and Sexual Activity    Alcohol use: No    Drug use: No    Sexual activity: Yes     Partners: Female     Past Surgical History:   Procedure Laterality Date    COLONOSCOPY N/A 06/05/2019    Procedure: COLONOSCOPY;  Surgeon: Kaiden Gallagher III, MD;  Location: [facility];  Service: Endoscopy;  Laterality: N/A;    ESOPHAGOGASTRODUODENOSCOPY N/A 10/12/2022    Procedure: EGD (ESOPHAGOGASTRODUODENOSCOPY);  Surgeon: Macario Alexis MD;  Location: [facility];  Service: Endoscopy;  Laterality: N/A;    GASTROSTOMY TUBE PLACEMENT      HERNIA REPAIR      SPINE SURGERY         Review of Systems   Constitutional: Negative.  Negative for chills, diaphoresis, fatigue and fever.   HENT: Negative.  Negative for nasal congestion and sore throat.    Eyes: Negative.    Respiratory: Negative.  Negative for cough.    Cardiovascular:  Negative for chest pain.   Gastrointestinal: Negative.  Negative for abdominal pain, anorexia, change in bowel habit, nausea and vomiting.   Endocrine: Negative.    Genitourinary: Negative.    Musculoskeletal:  Positive for arthralgias and leg pain. Negative for joint swelling, myalgias and neck pain.   Integumentary:  Negative for rash.        Healed abrasion to right elbow   Allergic/Immunologic: Negative.    Neurological: Negative.  Negative for vertigo, tingling, weakness, numbness and headaches.   Psychiatric/Behavioral: Negative.            Objective     Physical Exam  Vitals and nursing note reviewed.   Constitutional:       Appearance: Normal appearance.   HENT:      Head: Normocephalic.      Right Ear: Tympanic membrane, ear canal and external ear normal.      Left Ear: Tympanic membrane, ear canal and external ear normal.      Nose: Nose normal.      Mouth/Throat:      Mouth: Mucous membranes are moist.   Eyes:      Conjunctiva/sclera: Conjunctivae normal.      Pupils: Pupils are equal, round, and reactive to light.   Cardiovascular:      Rate  and Rhythm: Normal rate and regular rhythm.      Pulses: Normal pulses.      Heart sounds: Normal heart sounds.   Pulmonary:      Effort: Pulmonary effort is normal.      Breath sounds: Normal breath sounds.   Abdominal:      General: Bowel sounds are normal.      Palpations: Abdomen is soft.   Musculoskeletal:         General: Normal range of motion.      Right elbow: Swelling (moderate erythema, healed abrasion) present. No deformity, effusion or lacerations. Normal range of motion. No tenderness.      Cervical back: Normal range of motion and neck supple.      Right upper leg: No swelling, edema, deformity, lacerations, tenderness or bony tenderness.   Skin:     General: Skin is warm and dry.      Capillary Refill: Capillary refill takes 2 to 3 seconds.   Neurological:      Mental Status: He is alert and oriented to person, place, and time.   Psychiatric:         Mood and Affect: Mood normal.         Behavior: Behavior normal.         Thought Content: Thought content normal.         Judgment: Judgment normal.            Assessment and Plan     1. Injury of right elbow, subsequent encounter  2. Injury of right lower extremity, subsequent encounter  3. Cellulitis, unspecified cellulitis site  4. Laceration of right elbow, sequela  -     X-Ray Elbow Complete Right; Future; Expected date: 05/15/2024  -     X-Ray Femur 2 View Right; Future; Expected date: 05/15/2024  -     Ambulatory referral/consult to Orthopedics; Future; Expected date: 05/22/2024  -     doxycycline (VIBRA-TABS) 100 MG tablet; Take 1 tablet (100 mg total) by mouth 2 (two) times daily. for 10 days  Dispense: 20 each; Refill: 0  TDAP UTD  Ice to affected area 2-3x/d  Avoid strenuous activity, lifting  Hydrate well  Rest  Report to ER immediately if symptoms worsen or persist                     No follow-ups on file.

## 2024-05-15 NOTE — TELEPHONE ENCOUNTER
----- Message from Day Pacheco sent at 5/15/2024  9:02 AM CDT -----  Contact: kevin Atkins is calling regarding regarding a missed call.  Please give him a all back at [phone]

## 2024-05-16 ENCOUNTER — OFFICE VISIT (OUTPATIENT)
Dept: ORTHOPEDICS | Facility: CLINIC | Age: 74
End: 2024-05-16
Payer: MEDICARE

## 2024-05-16 VITALS — HEIGHT: 71 IN | WEIGHT: 198.63 LBS | BODY MASS INDEX: 27.81 KG/M2

## 2024-05-16 DIAGNOSIS — S59.901D INJURY OF RIGHT ELBOW, SUBSEQUENT ENCOUNTER: ICD-10-CM

## 2024-05-16 DIAGNOSIS — M70.21 OLECRANON BURSITIS, RIGHT ELBOW: Primary | ICD-10-CM

## 2024-05-16 DIAGNOSIS — S89.91XD INJURY OF RIGHT LOWER EXTREMITY, SUBSEQUENT ENCOUNTER: ICD-10-CM

## 2024-05-16 PROCEDURE — 1157F ADVNC CARE PLAN IN RCRD: CPT | Mod: CPTII,S$GLB,, | Performed by: ORTHOPAEDIC SURGERY

## 2024-05-16 PROCEDURE — 99999 PR PBB SHADOW E&M-EST. PATIENT-LVL III: CPT | Mod: PBBFAC,,, | Performed by: ORTHOPAEDIC SURGERY

## 2024-05-16 PROCEDURE — 1125F AMNT PAIN NOTED PAIN PRSNT: CPT | Mod: CPTII,S$GLB,, | Performed by: ORTHOPAEDIC SURGERY

## 2024-05-16 PROCEDURE — 3008F BODY MASS INDEX DOCD: CPT | Mod: CPTII,S$GLB,, | Performed by: ORTHOPAEDIC SURGERY

## 2024-05-16 PROCEDURE — 99203 OFFICE O/P NEW LOW 30 MIN: CPT | Mod: S$GLB,,, | Performed by: ORTHOPAEDIC SURGERY

## 2024-05-16 PROCEDURE — 3288F FALL RISK ASSESSMENT DOCD: CPT | Mod: CPTII,S$GLB,, | Performed by: ORTHOPAEDIC SURGERY

## 2024-05-16 PROCEDURE — 1159F MED LIST DOCD IN RCRD: CPT | Mod: CPTII,S$GLB,, | Performed by: ORTHOPAEDIC SURGERY

## 2024-05-16 PROCEDURE — 1101F PT FALLS ASSESS-DOCD LE1/YR: CPT | Mod: CPTII,S$GLB,, | Performed by: ORTHOPAEDIC SURGERY

## 2024-05-16 NOTE — PROGRESS NOTES
5/16/2024    Chief Complaint:  Chief Complaint   Patient presents with    Right Elbow - Injury, Pain     Injured 3 weeks ago        HPI:  Wilbert Luna is a 73 y.o. male, who presents to clinic today has a history of an injury to his right elbow.  A dead tree fell and he hit it with his elbow.  He did have a laceration over the elbow.  He subsequently developed some swelling there.  He has had some redness.  He did go to his primary care doctor and they took an x-ray and they gave him a prescription for doxycycline.  He was not yet started doxycycline    PMHX:  Past Medical History:   Diagnosis Date    Cancer skin cancer on head       PSHX:  Past Surgical History:   Procedure Laterality Date    COLONOSCOPY N/A 06/05/2019    Procedure: COLONOSCOPY;  Surgeon: Kaiden Gallagher III, MD;  Location: [facility];  Service: Endoscopy;  Laterality: N/A;    ESOPHAGOGASTRODUODENOSCOPY N/A 10/12/2022    Procedure: EGD (ESOPHAGOGASTRODUODENOSCOPY);  Surgeon: Macario Alexis MD;  Location: [facility];  Service: Endoscopy;  Laterality: N/A;    GASTROSTOMY TUBE PLACEMENT      HERNIA REPAIR      SPINE SURGERY         FMHX:  Family History   Problem Relation Name Age of Onset    Hypertension Mother      Cancer Father      Hypertension Maternal Grandmother      Thyroid disease Maternal Grandmother      Amblyopia Neg Hx      Blindness Neg Hx      Cataracts Neg Hx      Diabetes Neg Hx      Glaucoma Neg Hx      Macular degeneration Neg Hx      Retinal detachment Neg Hx      Strabismus Neg Hx      Stroke Neg Hx         SOCHX:  Social History     Tobacco Use    Smoking status: Never    Smokeless tobacco: Never   Substance Use Topics    Alcohol use: No       ALLERGIES:  Patient has no known allergies.    CURRENT MEDICATIONS:  Current Outpatient Medications on File Prior to Visit   Medication Sig Dispense Refill    doxycycline (VIBRA-TABS) 100 MG tablet Take 1 tablet (100 mg total) by mouth 2 (two) times daily. for 10 days 20 each 0     "esomeprazole (NEXIUM) 20 MG capsule Take 20 mg by mouth.      fluticasone propionate (FLONASE) 50 mcg/actuation nasal spray 2 sprays by Nasal route.       No current facility-administered medications on file prior to visit.       REVIEW OF SYSTEMS:  Review of Systems   Constitutional: Negative.    HENT: Negative.     Eyes: Negative.    Respiratory: Negative.     Cardiovascular: Negative.    Gastrointestinal: Negative.    Genitourinary: Negative.    Musculoskeletal:  Positive for joint pain.   Skin: Negative.    Neurological: Negative.    Endo/Heme/Allergies: Negative.    Psychiatric/Behavioral: Negative.       GENERAL PHYSICAL EXAM:   Ht 5' 11" (1.803 m)   Wt 90.1 kg (198 lb 10.2 oz)   BMI 27.70 kg/m²    GEN: well developed, well nourished, no acute distress   HENT: Normocephalic, atraumatic   EYES: No discharge, conjunctiva normal   NECK: Supple, non-tender   PULM: No wheezing, no respiratory distress   CV: RRR   ABD: Soft, non-tender    ORTHO EXAM:   Examination of the right elbow reveals that there is a healed laceration over the area of the olecranon.  There is an olecranon bursitis noted.  There is minimal erythema.  There was no significant increased warmth.  Palpation does produce mild edema.  He was able to flex and extend the elbow without significant pain.    RADIOLOGY:   X-rays of the right elbow from yesterday have been reviewed.  He was noted have no fractures or dislocations.  There are no foreign bodies noted    ASSESSMENT:   Right elbow olecranon bursitis    PLAN:  1. I have encouraged him to start taking the doxycycline twice daily    2. He will obtain a compression sleeve for the right elbow.  He will wear that full-time for the next several weeks     3.  He will follow up in 2 weeks for repeat evaluation.  If he has any increasing redness swelling or pain he will follow up with me immediately.      4.  I have discussed the possibility of surgical excision of the bursa if we are unable to get " this to improve with conservative means.

## 2024-09-11 ENCOUNTER — PATIENT MESSAGE (OUTPATIENT)
Dept: FAMILY MEDICINE | Facility: CLINIC | Age: 74
End: 2024-09-11
Payer: MEDICARE

## 2025-02-12 DIAGNOSIS — I10 ESSENTIAL HYPERTENSION: ICD-10-CM

## 2025-03-05 ENCOUNTER — LAB VISIT (OUTPATIENT)
Dept: LAB | Facility: HOSPITAL | Age: 75
End: 2025-03-05
Payer: MEDICARE

## 2025-03-05 DIAGNOSIS — I10 ESSENTIAL HYPERTENSION: ICD-10-CM

## 2025-03-05 DIAGNOSIS — M35.3 POLYMYALGIA: ICD-10-CM

## 2025-03-05 PROCEDURE — 84165 PROTEIN E-PHORESIS SERUM: CPT | Performed by: INTERNAL MEDICINE

## 2025-03-05 PROCEDURE — 84165 PROTEIN E-PHORESIS SERUM: CPT | Mod: 26,,, | Performed by: PATHOLOGY

## 2025-03-05 PROCEDURE — 36415 COLL VENOUS BLD VENIPUNCTURE: CPT | Mod: PO | Performed by: INTERNAL MEDICINE

## 2025-03-05 PROCEDURE — 80053 COMPREHEN METABOLIC PANEL: CPT | Performed by: FAMILY MEDICINE

## 2025-03-06 LAB
ALBUMIN SERPL BCP-MCNC: 4 G/DL (ref 3.5–5.2)
ALBUMIN SERPL ELPH-MCNC: 4.09 G/DL (ref 3.35–5.55)
ALP SERPL-CCNC: 63 U/L (ref 40–150)
ALPHA1 GLOB SERPL ELPH-MCNC: 0.3 G/DL (ref 0.17–0.41)
ALPHA2 GLOB SERPL ELPH-MCNC: 0.75 G/DL (ref 0.43–0.99)
ALT SERPL W/O P-5'-P-CCNC: 11 U/L (ref 10–44)
ANION GAP SERPL CALC-SCNC: 11 MMOL/L (ref 8–16)
AST SERPL-CCNC: 49 U/L (ref 10–40)
B-GLOBULIN SERPL ELPH-MCNC: 0.75 G/DL (ref 0.5–1.1)
BILIRUB SERPL-MCNC: 0.5 MG/DL (ref 0.1–1)
BUN SERPL-MCNC: 14 MG/DL (ref 8–23)
CALCIUM SERPL-MCNC: 9.5 MG/DL (ref 8.7–10.5)
CHLORIDE SERPL-SCNC: 104 MMOL/L (ref 95–110)
CO2 SERPL-SCNC: 23 MMOL/L (ref 23–29)
CREAT SERPL-MCNC: 1.2 MG/DL (ref 0.5–1.4)
EST. GFR  (NO RACE VARIABLE): >60 ML/MIN/1.73 M^2
GAMMA GLOB SERPL ELPH-MCNC: 0.91 G/DL (ref 0.67–1.58)
GLUCOSE SERPL-MCNC: 76 MG/DL (ref 70–110)
POTASSIUM SERPL-SCNC: 4.2 MMOL/L (ref 3.5–5.1)
PROT SERPL-MCNC: 6.8 G/DL (ref 6–8.4)
PROT SERPL-MCNC: 7.4 G/DL (ref 6–8.4)
SODIUM SERPL-SCNC: 138 MMOL/L (ref 136–145)

## 2025-03-07 ENCOUNTER — RESULTS FOLLOW-UP (OUTPATIENT)
Dept: FAMILY MEDICINE | Facility: CLINIC | Age: 75
End: 2025-03-07

## 2025-03-07 DIAGNOSIS — E78.5 HYPERLIPIDEMIA, UNSPECIFIED HYPERLIPIDEMIA TYPE: ICD-10-CM

## 2025-03-07 DIAGNOSIS — I10 ESSENTIAL HYPERTENSION: Primary | ICD-10-CM

## 2025-03-07 DIAGNOSIS — Z12.5 ENCOUNTER FOR PROSTATE CANCER SCREENING: ICD-10-CM

## 2025-03-07 NOTE — PROGRESS NOTES
The CMP is a comprehensive metabolic panel of all electrolytes including a look at the liver, kidney and to look at your sugar level.  All of the numbers appear acceptable and no changes are recommended. You had one of your liver functions that was minimally increased but without the other ones being increased, I do not recommend a workup on it at this point.  Repeat all in 1 year.

## 2025-03-08 LAB — PATHOLOGIST INTERPRETATION SPE: NORMAL

## 2025-03-10 NOTE — TELEPHONE ENCOUNTER
Let him know that he did not have a lipid profile drawn at the time of the draw for the electrolytes.  In review of the chart the patient has not had a PSA in a year.  Please schedule both of these to be drawn.  The patient is also due for multiple immunizations.  I placed orders for the blood work.    Health Maintenance Due   Topic Date Due    Shingles Vaccine (1 of 2) Never done    RSV Vaccine (Age 60+ and Pregnant patients) (1 - Risk 60-74 years 1-dose series) Never done    Influenza Vaccine (1) 09/01/2024    COVID-19 Vaccine (1 - 2024-25 season) Never done    PROSTATE-SPECIFIC ANTIGEN  02/01/2025     Due to the fact that the patient is on Medicare, these would need to be done at a pharmacy.I have signed for the following orders AND/OR meds.  Please call the patient and ask the patient to schedule the testing AND/OR inform about any medications that were sent.      Orders Placed This Encounter   Procedures    Lipid Panel     Standing Status:   Future     Expected Date:   3/10/2025     Expiration Date:   3/10/2026     Send normal result to authorizing provider's In Basket if patient is active on MyChart::   Yes    PSA, Screening     Standing Status:   Future     Expected Date:   3/10/2025     Expiration Date:   3/11/2026     Send normal result to authorizing provider's In Basket if patient is active on MyChart::   No

## 2025-03-12 ENCOUNTER — LAB VISIT (OUTPATIENT)
Dept: LAB | Facility: HOSPITAL | Age: 75
End: 2025-03-12
Attending: FAMILY MEDICINE
Payer: MEDICARE

## 2025-03-12 DIAGNOSIS — E78.5 HYPERLIPIDEMIA, UNSPECIFIED HYPERLIPIDEMIA TYPE: ICD-10-CM

## 2025-03-12 DIAGNOSIS — Z12.5 ENCOUNTER FOR PROSTATE CANCER SCREENING: ICD-10-CM

## 2025-03-12 DIAGNOSIS — I10 ESSENTIAL HYPERTENSION: ICD-10-CM

## 2025-03-12 LAB
CHOLEST SERPL-MCNC: 191 MG/DL (ref 120–199)
CHOLEST/HDLC SERPL: 3.2 {RATIO} (ref 2–5)
COMPLEXED PSA SERPL-MCNC: 1 NG/ML (ref 0–4)
HDLC SERPL-MCNC: 59 MG/DL (ref 40–75)
HDLC SERPL: 30.9 % (ref 20–50)
LDLC SERPL CALC-MCNC: 113 MG/DL (ref 63–159)
NONHDLC SERPL-MCNC: 132 MG/DL
TRIGL SERPL-MCNC: 95 MG/DL (ref 30–150)

## 2025-03-12 PROCEDURE — 36415 COLL VENOUS BLD VENIPUNCTURE: CPT | Mod: PO | Performed by: FAMILY MEDICINE

## 2025-03-12 PROCEDURE — 84153 ASSAY OF PSA TOTAL: CPT | Performed by: FAMILY MEDICINE

## 2025-03-12 PROCEDURE — 80061 LIPID PANEL: CPT | Performed by: FAMILY MEDICINE

## 2025-03-13 ENCOUNTER — RESULTS FOLLOW-UP (OUTPATIENT)
Dept: FAMILY MEDICINE | Facility: CLINIC | Age: 75
End: 2025-03-13

## 2025-03-13 NOTE — PROGRESS NOTES
"I have reviewed the labs and am recommending the following:  Your calculated risk of having a heart attack in the next 10 years is listed as a percentage in the "ASCVD score" below.  ASCVD RISK SCORE AND CALCULATION PARAMETERS The 10-year ASCVD risk score (Pete ARCHIBALD, et al., 2019) is: 17.3%    Values used to calculate the score:      Age: 74 years      Sex: Male      Is Non- : No      Diabetic: No      Tobacco smoker: No      Systolic Blood Pressure: 121 mmHg      Is BP treated: No      HDL Cholesterol: 59 mg/dL      Total Cholesterol: 191 mg/dL   Lowering the cholesterol can help keep this number as low as possible.  Please work hard on your diet and exercise and try to keep your weight and blood pressure as close to normal as possible to help keep this score low.  The parameters that this score was calculated on are below. Based on your score, I recommend that you We usually add medicine if the count is 10% or greater.   With yours being >10%, close attention needs to be paid to keeping your intake of cholesterol as low as possible.  Use diet, exercise and keeping your weight at a normal body mass index to keep your cholesterol as low as possible.  In addition, I recommend starting CRESTOR 10 MG A DAY to help you get this to goal.  iF THIS IS AGREEABLE, WE WILL NEED TO SEND IN THE MEDICINE AND recheck the cholesterol in 3 months with a lipid panel.         Health maintenance items that remain on your list that need to be arranged are listed below.   Please notify me if you are pre  pared to get them completed.    Shingles Vaccine(1 of 2) Never done  RSV Vaccine (Age 60+ and Pregnant patients)(1 - Risk 60-74 years 1-dose series) Never done  Influenza Vaccine(1) due on 09/01/2024  COVID-19 Vaccine(1 - 2024-25 season) Never done    Dr. Sergio Quick  "

## 2025-03-28 ENCOUNTER — TELEPHONE (OUTPATIENT)
Dept: FAMILY MEDICINE | Facility: CLINIC | Age: 75
End: 2025-03-28
Payer: MEDICARE

## 2025-03-28 NOTE — TELEPHONE ENCOUNTER
----- Message from Richy sent at 3/28/2025 10:01 AM CDT -----  Contact: Pt  .Type:  Needs Medical AdviceWho Called: PtWould the patient rather a call back or a response via [facility]?  Call [facility] Call Back Number:  [phone] Information: pt. Is returning a missed called regarding his lab results.

## 2025-08-29 ENCOUNTER — PATIENT MESSAGE (OUTPATIENT)
Dept: ADMINISTRATIVE | Facility: HOSPITAL | Age: 75
End: 2025-08-29
Payer: MEDICARE

## 2025-08-29 ENCOUNTER — LAB VISIT (OUTPATIENT)
Dept: LAB | Facility: HOSPITAL | Age: 75
End: 2025-08-29
Attending: NURSE PRACTITIONER
Payer: MEDICARE

## 2025-08-29 DIAGNOSIS — K59.09 CHRONIC CONSTIPATION: ICD-10-CM

## 2025-08-29 DIAGNOSIS — R79.9 ABNORMAL FINDING OF BLOOD CHEMISTRY, UNSPECIFIED: ICD-10-CM

## 2025-08-29 LAB
ALBUMIN SERPL BCP-MCNC: 4.1 G/DL (ref 3.5–5.2)
ALP SERPL-CCNC: 72 UNIT/L (ref 40–150)
ALT SERPL W/O P-5'-P-CCNC: 19 UNIT/L (ref 0–55)
ANION GAP (OHS): 9 MMOL/L (ref 8–16)
AST SERPL-CCNC: 30 UNIT/L (ref 0–50)
BILIRUB SERPL-MCNC: 0.4 MG/DL (ref 0.1–1)
BUN SERPL-MCNC: 18 MG/DL (ref 8–23)
CALCIUM SERPL-MCNC: 9.4 MG/DL (ref 8.7–10.5)
CHLORIDE SERPL-SCNC: 105 MMOL/L (ref 95–110)
CHOLEST SERPL-MCNC: 208 MG/DL (ref 120–199)
CHOLEST/HDLC SERPL: 3.5 {RATIO} (ref 2–5)
CO2 SERPL-SCNC: 26 MMOL/L (ref 23–29)
CREAT SERPL-MCNC: 1.2 MG/DL (ref 0.5–1.4)
GFR SERPLBLD CREATININE-BSD FMLA CKD-EPI: >60 ML/MIN/1.73/M2
GLUCOSE SERPL-MCNC: 77 MG/DL (ref 70–110)
HDLC SERPL-MCNC: 60 MG/DL (ref 40–75)
HDLC SERPL: 28.8 % (ref 20–50)
LDLC SERPL CALC-MCNC: 130 MG/DL (ref 63–159)
NONHDLC SERPL-MCNC: 148 MG/DL
POTASSIUM SERPL-SCNC: 5.3 MMOL/L (ref 3.5–5.1)
PROT SERPL-MCNC: 7.2 GM/DL (ref 6–8.4)
SODIUM SERPL-SCNC: 140 MMOL/L (ref 136–145)
TRIGL SERPL-MCNC: 90 MG/DL (ref 30–150)
TSH SERPL-ACNC: 2.42 UIU/ML (ref 0.4–4)

## 2025-08-29 PROCEDURE — 84443 ASSAY THYROID STIM HORMONE: CPT

## 2025-08-29 PROCEDURE — 36415 COLL VENOUS BLD VENIPUNCTURE: CPT | Mod: PO

## 2025-08-29 PROCEDURE — 80061 LIPID PANEL: CPT

## 2025-08-29 PROCEDURE — 80053 COMPREHEN METABOLIC PANEL: CPT
